# Patient Record
Sex: MALE | Race: WHITE | NOT HISPANIC OR LATINO | Employment: OTHER | URBAN - METROPOLITAN AREA
[De-identification: names, ages, dates, MRNs, and addresses within clinical notes are randomized per-mention and may not be internally consistent; named-entity substitution may affect disease eponyms.]

---

## 2023-03-21 DIAGNOSIS — I10 HYPERTENSION, UNSPECIFIED TYPE: ICD-10-CM

## 2023-03-21 PROBLEM — T85.898A OBSTRUCTED PRESSURE-EQUALIZATION (PE) TUBE: Status: ACTIVE | Noted: 2023-03-21

## 2023-03-21 PROBLEM — H90.5 SENSORINEURAL HEARING LOSS OF RIGHT EAR: Status: ACTIVE | Noted: 2023-03-21

## 2023-03-21 PROBLEM — H69.90 EUSTACHIAN TUBE DYSFUNCTION: Status: ACTIVE | Noted: 2023-03-21

## 2023-03-21 PROBLEM — R09.81 NASAL CONGESTION: Status: ACTIVE | Noted: 2023-03-21

## 2023-03-21 PROBLEM — R05.9 COUGH: Status: ACTIVE | Noted: 2023-03-21

## 2023-03-21 PROBLEM — M19.90 OSTEOARTHRITIS: Status: ACTIVE | Noted: 2023-03-21

## 2023-03-21 PROBLEM — F32.5 MAJOR DEPRESSION IN REMISSION (CMS-HCC): Status: ACTIVE | Noted: 2023-03-21

## 2023-03-21 PROBLEM — E55.9 VITAMIN D DEFICIENCY: Status: ACTIVE | Noted: 2023-03-21

## 2023-03-21 PROBLEM — F41.9 ANXIETY: Status: ACTIVE | Noted: 2023-03-21

## 2023-03-21 PROBLEM — H92.12 OTORRHEA, LEFT EAR: Status: ACTIVE | Noted: 2023-03-21

## 2023-03-21 PROBLEM — L91.8 CUTANEOUS SKIN TAGS: Status: ACTIVE | Noted: 2023-03-21

## 2023-03-21 PROBLEM — H61.20 CERUMEN IMPACTION: Status: ACTIVE | Noted: 2023-03-21

## 2023-03-21 PROBLEM — H65.22 CHRONIC SEROUS OTITIS MEDIA OF LEFT EAR: Status: ACTIVE | Noted: 2023-03-21

## 2023-03-21 PROBLEM — R53.81 MALAISE: Status: ACTIVE | Noted: 2023-03-21

## 2023-03-21 PROBLEM — H90.72 MIXED HEARING LOSS OF LEFT EAR: Status: ACTIVE | Noted: 2023-03-21

## 2023-03-21 PROBLEM — H65.00 ACUTE SEROUS OTITIS MEDIA: Status: ACTIVE | Noted: 2023-03-21

## 2023-03-21 RX ORDER — ERGOCALCIFEROL (VITAMIN D2) 50 MCG
CAPSULE ORAL
COMMUNITY

## 2023-03-21 RX ORDER — VALSARTAN AND HYDROCHLOROTHIAZIDE 320; 25 MG/1; MG/1
1 TABLET, FILM COATED ORAL DAILY
COMMUNITY
Start: 2020-06-18 | End: 2023-03-21 | Stop reason: SDUPTHER

## 2023-03-21 RX ORDER — VALSARTAN AND HYDROCHLOROTHIAZIDE 320; 25 MG/1; MG/1
1 TABLET, FILM COATED ORAL DAILY
Qty: 90 TABLET | Refills: 0 | Status: SHIPPED | OUTPATIENT
Start: 2023-03-21 | End: 2023-06-20 | Stop reason: SDUPTHER

## 2023-03-21 RX ORDER — ATENOLOL 100 MG/1
100 TABLET ORAL DAILY
COMMUNITY
Start: 2022-12-20 | End: 2023-06-20 | Stop reason: SDUPTHER

## 2023-03-21 RX ORDER — SERTRALINE HYDROCHLORIDE 50 MG/1
50 TABLET, FILM COATED ORAL DAILY
COMMUNITY
End: 2023-06-20 | Stop reason: SDUPTHER

## 2023-06-09 ENCOUNTER — TELEPHONE (OUTPATIENT)
Dept: PRIMARY CARE | Facility: CLINIC | Age: 73
End: 2023-06-09
Payer: MEDICARE

## 2023-06-09 NOTE — TELEPHONE ENCOUNTER
Day pt   Needs refill on zoloft 50mg daily #30    Called into [facility] phone [phone]

## 2023-06-09 NOTE — TELEPHONE ENCOUNTER
I LM with pt, he needs to schedule 6 month follow up for refills. Pt has appt in Dec but is for Medicare wellness

## 2023-06-20 ENCOUNTER — OFFICE VISIT (OUTPATIENT)
Dept: PRIMARY CARE | Facility: CLINIC | Age: 73
End: 2023-06-20
Payer: MEDICARE

## 2023-06-20 VITALS
BODY MASS INDEX: 39.1 KG/M2 | TEMPERATURE: 98.1 F | HEART RATE: 78 BPM | HEIGHT: 69 IN | SYSTOLIC BLOOD PRESSURE: 130 MMHG | WEIGHT: 264 LBS | RESPIRATION RATE: 20 BRPM | DIASTOLIC BLOOD PRESSURE: 92 MMHG

## 2023-06-20 DIAGNOSIS — F41.1 GENERALIZED ANXIETY DISORDER: ICD-10-CM

## 2023-06-20 DIAGNOSIS — R05.3 CHRONIC COUGH: ICD-10-CM

## 2023-06-20 DIAGNOSIS — I10 HYPERTENSION, UNSPECIFIED TYPE: ICD-10-CM

## 2023-06-20 DIAGNOSIS — F32.5 MAJOR DEPRESSION IN REMISSION (CMS-HCC): Primary | ICD-10-CM

## 2023-06-20 PROBLEM — H65.00 ACUTE SEROUS OTITIS MEDIA: Status: RESOLVED | Noted: 2023-03-21 | Resolved: 2023-06-20

## 2023-06-20 PROBLEM — R09.81 NASAL CONGESTION: Status: RESOLVED | Noted: 2023-03-21 | Resolved: 2023-06-20

## 2023-06-20 PROBLEM — H61.20 CERUMEN IMPACTION: Status: RESOLVED | Noted: 2023-03-21 | Resolved: 2023-06-20

## 2023-06-20 PROCEDURE — 1160F RVW MEDS BY RX/DR IN RCRD: CPT | Performed by: FAMILY MEDICINE

## 2023-06-20 PROCEDURE — 3080F DIAST BP >= 90 MM HG: CPT | Performed by: FAMILY MEDICINE

## 2023-06-20 PROCEDURE — 3075F SYST BP GE 130 - 139MM HG: CPT | Performed by: FAMILY MEDICINE

## 2023-06-20 PROCEDURE — 1159F MED LIST DOCD IN RCRD: CPT | Performed by: FAMILY MEDICINE

## 2023-06-20 PROCEDURE — 1036F TOBACCO NON-USER: CPT | Performed by: FAMILY MEDICINE

## 2023-06-20 PROCEDURE — 99214 OFFICE O/P EST MOD 30 MIN: CPT | Performed by: FAMILY MEDICINE

## 2023-06-20 RX ORDER — ATENOLOL 100 MG/1
100 TABLET ORAL DAILY
Qty: 90 TABLET | Refills: 1 | Status: SHIPPED | OUTPATIENT
Start: 2023-06-20 | End: 2023-12-05 | Stop reason: SDUPTHER

## 2023-06-20 RX ORDER — SERTRALINE HYDROCHLORIDE 50 MG/1
50 TABLET, FILM COATED ORAL DAILY
Qty: 90 TABLET | Refills: 1 | Status: SHIPPED | OUTPATIENT
Start: 2023-06-20 | End: 2023-12-05 | Stop reason: SDUPTHER

## 2023-06-20 RX ORDER — VALSARTAN AND HYDROCHLOROTHIAZIDE 320; 25 MG/1; MG/1
1 TABLET, FILM COATED ORAL DAILY
Qty: 90 TABLET | Refills: 1 | Status: SHIPPED | OUTPATIENT
Start: 2023-06-20 | End: 2023-12-05 | Stop reason: SDUPTHER

## 2023-06-20 NOTE — PROGRESS NOTES
Brian Larry is a 73 y.o. male here today for   Chief Complaint   Patient presents with    Hypertension    Anxiety    Cough      HTN recheck -- Patient denies chest pain, SOB, edema, palpitations on review.  Taking medication correctly and denies any side effects.   Good when he checks at home.    Mood disorder recheck -- Patient feels like condition is well controlled.  Has good control of mood and emotional reactions.  No SE's or problems with medications.  No homicidal or suicidal ideation.  Patient wishes to continue same medications.   Ran out of sertraline about 10 days ago and he has noticed some increased anxiety and a mild headache.  He would like to restart the sertraline.    Also would like ears checked for wax.      Still with minor cough after he eats for about 15 minutes.   Sury with dairy.  No reflux sxs.  He has completely stopped smoking, vapping and marijuana use for months and he reports that the cough has improved since then.  He denies any shortness of breath, wheezing, chest pain, leg swelling.  He has no past medical history of a chronic lung disease.        Current Outpatient Medications:     ergocalciferol (Vitamin D-2) 50 MCG (2000 UT) capsule capsule, Take by mouth., Disp: , Rfl:     atenolol (Tenormin) 100 mg tablet, Take 1 tablet (100 mg) by mouth once daily., Disp: 90 tablet, Rfl: 1    sertraline (Zoloft) 50 mg tablet, Take 1 tablet (50 mg) by mouth once daily., Disp: 90 tablet, Rfl: 1    valsartan-hydrochlorothiazide (Diovan-HCT) 320-25 mg tablet, Take 1 tablet by mouth once daily., Disp: 90 tablet, Rfl: 1    Patient Active Problem List   Diagnosis    Generalized anxiety disorder    Chronic serous otitis media of left ear    Eustachian tube dysfunction    Cutaneous skin tags    Cough    HTN (hypertension)    Mixed hearing loss of left ear    Malaise    Major depression in remission (CMS/HCC)    Obstructed pressure-equalization (PE) tube    Otorrhea, left ear    Osteoarthritis     "Sensorineural hearing loss of right ear    Vitamin D deficiency         No results found for this or any previous visit (from the past 2016 hour(s)).     Objective    Visit Vitals  BP (!) 130/92   Pulse 78   Temp 36.7 °C (98.1 °F)   Resp 20   Ht 1.74 m (5' 8.5\")   Wt 120 kg (264 lb)   BMI 39.56 kg/m²     Body mass index is 39.56 kg/m².     Physical Exam   General - Not in acute distress and cooperative.  Build & Nutrition - Well developed  Posture - Normal  Gait - Normal  Mental Status - alert and oriented x 3    Head - Normocephalic    Neck - Thyroid normal size    Eyes - Bilateral - Sclera clear and lids pink without edema or mass.      Skin - Warm and dry with no rashes on visible skin    Lungs - Clear to auscultation and normal breathing effort    Cardiovascular - RRR and no murmurs, rubs or thrill.    Peripheral Vascular - Bilateral - no edema present    Neuropsychiatric - normal mood and affect        Assessment    1. Major depression in remission (CMS/HCC)     Condition well controlled.  No change in current treatment regimen.  Refill given of current medication.  Make a follow up appointment with me for recheck in 6 months.     2. Hypertension, unspecified type  atenolol (Tenormin) 100 mg tablet, sertraline (Zoloft) 50 mg tablet, valsartan-hydrochlorothiazide (Diovan-HCT) 320-25 mg tablet, Comprehensive Metabolic Panel, CBC, Lipid Panel   Condition well controlled.  No change in current treatment regimen.  Refill given of current medication.  Appropriate labs ordered or reviewed.  Make a follow up appointment with me for recheck in 6 months.     3. Generalized anxiety disorder     Condition well controlled.  No change in current treatment regimen.  Refill given of current medication.  Make a follow up appointment with me for recheck in 6 months.     4. Chronic cough  XR chest 2 views   This has improved significantly since he stopped vaping.  Since he has a long history of previous tobacco use I am " going to get a chest x-ray.  If the chest x-ray is normal I suggested that he give it about 2 more months.  If his cough has not fully resolved then he may need further evaluation with a CT scan or pulmonary function testing.  We will call him with results.

## 2023-06-21 ENCOUNTER — LAB (OUTPATIENT)
Dept: LAB | Facility: LAB | Age: 73
End: 2023-06-21
Payer: MEDICARE

## 2023-06-21 DIAGNOSIS — I10 HYPERTENSION, UNSPECIFIED TYPE: ICD-10-CM

## 2023-06-21 LAB
ALANINE AMINOTRANSFERASE (SGPT) (U/L) IN SER/PLAS: 19 U/L (ref 10–52)
ALBUMIN (G/DL) IN SER/PLAS: 4.1 G/DL (ref 3.4–5)
ALKALINE PHOSPHATASE (U/L) IN SER/PLAS: 53 U/L (ref 33–136)
ANION GAP IN SER/PLAS: 11 MMOL/L (ref 10–20)
ASPARTATE AMINOTRANSFERASE (SGOT) (U/L) IN SER/PLAS: 17 U/L (ref 9–39)
BILIRUBIN TOTAL (MG/DL) IN SER/PLAS: 0.6 MG/DL (ref 0–1.2)
CALCIUM (MG/DL) IN SER/PLAS: 8.9 MG/DL (ref 8.6–10.3)
CARBON DIOXIDE, TOTAL (MMOL/L) IN SER/PLAS: 29 MMOL/L (ref 21–32)
CHLORIDE (MMOL/L) IN SER/PLAS: 102 MMOL/L (ref 98–107)
CHOLESTEROL (MG/DL) IN SER/PLAS: 155 MG/DL (ref 0–199)
CHOLESTEROL IN HDL (MG/DL) IN SER/PLAS: 29.1 MG/DL
CHOLESTEROL/HDL RATIO: 5.3
CREATININE (MG/DL) IN SER/PLAS: 1.16 MG/DL (ref 0.5–1.3)
ERYTHROCYTE DISTRIBUTION WIDTH (RATIO) BY AUTOMATED COUNT: 13.2 % (ref 11.5–14.5)
ERYTHROCYTE MEAN CORPUSCULAR HEMOGLOBIN CONCENTRATION (G/DL) BY AUTOMATED: 33.3 G/DL (ref 32–36)
ERYTHROCYTE MEAN CORPUSCULAR VOLUME (FL) BY AUTOMATED COUNT: 90 FL (ref 80–100)
ERYTHROCYTES (10*6/UL) IN BLOOD BY AUTOMATED COUNT: 4.91 X10E12/L (ref 4.5–5.9)
GFR MALE: 67 ML/MIN/1.73M2
GLUCOSE (MG/DL) IN SER/PLAS: 122 MG/DL (ref 74–99)
HEMATOCRIT (%) IN BLOOD BY AUTOMATED COUNT: 44.2 % (ref 41–52)
HEMOGLOBIN (G/DL) IN BLOOD: 14.7 G/DL (ref 13.5–17.5)
LDL: 80 MG/DL (ref 0–99)
LEUKOCYTES (10*3/UL) IN BLOOD BY AUTOMATED COUNT: 6.5 X10E9/L (ref 4.4–11.3)
NON HDL CHOLESTEROL: 126 MG/DL
PLATELETS (10*3/UL) IN BLOOD AUTOMATED COUNT: 170 X10E9/L (ref 150–450)
POTASSIUM (MMOL/L) IN SER/PLAS: 3.8 MMOL/L (ref 3.5–5.3)
PROTEIN TOTAL: 6.9 G/DL (ref 6.4–8.2)
SODIUM (MMOL/L) IN SER/PLAS: 138 MMOL/L (ref 136–145)
TRIGLYCERIDE (MG/DL) IN SER/PLAS: 231 MG/DL (ref 0–149)
UREA NITROGEN (MG/DL) IN SER/PLAS: 17 MG/DL (ref 6–23)
VLDL: 46 MG/DL (ref 0–40)

## 2023-06-21 PROCEDURE — 85027 COMPLETE CBC AUTOMATED: CPT

## 2023-06-21 PROCEDURE — 80053 COMPREHEN METABOLIC PANEL: CPT

## 2023-06-21 PROCEDURE — 80061 LIPID PANEL: CPT

## 2023-06-21 PROCEDURE — 36415 COLL VENOUS BLD VENIPUNCTURE: CPT

## 2023-06-22 ENCOUNTER — TELEPHONE (OUTPATIENT)
Dept: PRIMARY CARE | Facility: CLINIC | Age: 73
End: 2023-06-22
Payer: MEDICARE

## 2023-06-22 NOTE — TELEPHONE ENCOUNTER
----- Message from Zion Bernstein MD sent at 6/22/2023  7:54 AM EDT -----  Inform patient of normal results of all recent labs.   Even though some of the lab values may fall outside of the normal range, I do not feel they are clinically concerning or relevant at this time.

## 2023-06-23 ENCOUNTER — TELEPHONE (OUTPATIENT)
Dept: PRIMARY CARE | Facility: CLINIC | Age: 73
End: 2023-06-23
Payer: MEDICARE

## 2023-06-23 NOTE — TELEPHONE ENCOUNTER
----- Message from Zion Bernstein MD sent at 6/23/2023  7:52 AM EDT -----  Inform the patient that his recent chest x-ray was completely normal.

## 2023-10-09 ENCOUNTER — CLINICAL SUPPORT (OUTPATIENT)
Dept: PRIMARY CARE | Facility: CLINIC | Age: 73
End: 2023-10-09
Payer: MEDICARE

## 2023-10-09 DIAGNOSIS — Z23 ENCOUNTER FOR IMMUNIZATION: ICD-10-CM

## 2023-10-09 PROCEDURE — 90662 IIV NO PRSV INCREASED AG IM: CPT | Performed by: FAMILY MEDICINE

## 2023-10-09 PROCEDURE — G0008 ADMIN INFLUENZA VIRUS VAC: HCPCS | Performed by: FAMILY MEDICINE

## 2023-12-05 ENCOUNTER — OFFICE VISIT (OUTPATIENT)
Dept: PRIMARY CARE | Facility: CLINIC | Age: 73
End: 2023-12-05
Payer: MEDICARE

## 2023-12-05 VITALS
HEART RATE: 64 BPM | TEMPERATURE: 97.1 F | BODY MASS INDEX: 37.92 KG/M2 | DIASTOLIC BLOOD PRESSURE: 88 MMHG | WEIGHT: 256 LBS | RESPIRATION RATE: 16 BRPM | HEIGHT: 69 IN | SYSTOLIC BLOOD PRESSURE: 132 MMHG

## 2023-12-05 DIAGNOSIS — I10 PRIMARY HYPERTENSION: ICD-10-CM

## 2023-12-05 DIAGNOSIS — Z12.5 SCREENING FOR PROSTATE CANCER: ICD-10-CM

## 2023-12-05 DIAGNOSIS — F41.1 GENERALIZED ANXIETY DISORDER: ICD-10-CM

## 2023-12-05 DIAGNOSIS — J06.9 VIRAL URI WITH COUGH: ICD-10-CM

## 2023-12-05 DIAGNOSIS — Z11.59 NEED FOR HEPATITIS C SCREENING TEST: ICD-10-CM

## 2023-12-05 DIAGNOSIS — Z13.6 SCREENING FOR AAA (ABDOMINAL AORTIC ANEURYSM): ICD-10-CM

## 2023-12-05 DIAGNOSIS — Z00.00 ROUTINE GENERAL MEDICAL EXAMINATION AT HEALTH CARE FACILITY: Primary | ICD-10-CM

## 2023-12-05 PROCEDURE — 1160F RVW MEDS BY RX/DR IN RCRD: CPT | Performed by: FAMILY MEDICINE

## 2023-12-05 PROCEDURE — G0439 PPPS, SUBSEQ VISIT: HCPCS | Performed by: FAMILY MEDICINE

## 2023-12-05 PROCEDURE — G0444 DEPRESSION SCREEN ANNUAL: HCPCS | Performed by: FAMILY MEDICINE

## 2023-12-05 PROCEDURE — 1170F FXNL STATUS ASSESSED: CPT | Performed by: FAMILY MEDICINE

## 2023-12-05 PROCEDURE — 99213 OFFICE O/P EST LOW 20 MIN: CPT | Performed by: FAMILY MEDICINE

## 2023-12-05 PROCEDURE — 1159F MED LIST DOCD IN RCRD: CPT | Performed by: FAMILY MEDICINE

## 2023-12-05 PROCEDURE — 1036F TOBACCO NON-USER: CPT | Performed by: FAMILY MEDICINE

## 2023-12-05 PROCEDURE — 3079F DIAST BP 80-89 MM HG: CPT | Performed by: FAMILY MEDICINE

## 2023-12-05 PROCEDURE — 3075F SYST BP GE 130 - 139MM HG: CPT | Performed by: FAMILY MEDICINE

## 2023-12-05 RX ORDER — ALBUTEROL SULFATE 90 UG/1
2 AEROSOL, METERED RESPIRATORY (INHALATION) EVERY 6 HOURS PRN
Qty: 18 G | Refills: 1 | Status: SHIPPED | OUTPATIENT
Start: 2023-12-05

## 2023-12-05 RX ORDER — VALSARTAN AND HYDROCHLOROTHIAZIDE 320; 25 MG/1; MG/1
1 TABLET, FILM COATED ORAL DAILY
Qty: 90 TABLET | Refills: 1 | Status: SHIPPED | OUTPATIENT
Start: 2023-12-05 | End: 2024-06-04 | Stop reason: SDUPTHER

## 2023-12-05 RX ORDER — ATENOLOL 100 MG/1
100 TABLET ORAL DAILY
Qty: 90 TABLET | Refills: 1 | Status: SHIPPED | OUTPATIENT
Start: 2023-12-05 | End: 2024-06-04 | Stop reason: SDUPTHER

## 2023-12-05 RX ORDER — SERTRALINE HYDROCHLORIDE 50 MG/1
50 TABLET, FILM COATED ORAL DAILY
Qty: 90 TABLET | Refills: 1 | Status: SHIPPED | OUTPATIENT
Start: 2023-12-05 | End: 2024-06-04 | Stop reason: SDUPTHER

## 2023-12-05 ASSESSMENT — ACTIVITIES OF DAILY LIVING (ADL)
BATHING: INDEPENDENT
DOING_HOUSEWORK: INDEPENDENT
MANAGING_FINANCES: INDEPENDENT
TAKING_MEDICATION: INDEPENDENT
GROCERY_SHOPPING: INDEPENDENT
DRESSING: INDEPENDENT

## 2023-12-05 ASSESSMENT — PATIENT HEALTH QUESTIONNAIRE - PHQ9
2. FEELING DOWN, DEPRESSED OR HOPELESS: NOT AT ALL
1. LITTLE INTEREST OR PLEASURE IN DOING THINGS: NOT AT ALL
SUM OF ALL RESPONSES TO PHQ9 QUESTIONS 1 AND 2: 0

## 2023-12-05 NOTE — PROGRESS NOTES
History Of Present Illness  Brian Larry is a 73 y.o. male presenting for a Medicare Annual Wellness Exam.  Patient is here for a periodic health exam.  I reviewed previous preventative health measures including screening tests, immunizations and labs.  I reviewed screenings administered by my staff today.    He has had a mild cough for about 1 month which is slowly getting better.  He asked for refill of albuterol for as needed use.    HTN recheck -- Patient denies chest pain, SOB, edema, palpitations on review.  Taking medication correctly and denies any side effects.      Mood disorder recheck -- Patient feels like condition is well controlled.  Has good control of mood and emotional reactions.  No SE's or problems with medications.  No homicidal or suicidal ideation.  Patient wishes to continue same medications.      PREVIOUS PREVENTATIVE HEALTH  Colonoscopy : Yes    Date: 8/2021  Cologuard : No  Prostate cancer screening with PSA : Yes    Date: 12/21/2022  Hepatitis C Antibody : No  Prevnar : Yes    Date: 7/21/2016-Prevnar 13  Shingrix : Yes    Date: 6/22/2012-Zostavax  Tdap within 10 years : Yes  Yearly Flu Shot : Yes      CURRENT FINDINGS  Falls : No  Problems with ADL's :  No  Healthy Diet : Yes  Exercise :  Yes  Vision or Hearing Problems : Yes  Depression Issues : No  Alcohol Use : Yes.  About 9-10 drinks per week.    Tobacco Use : No.  Previous smoker.           Past Medical History  Patient Active Problem List   Diagnosis    Generalized anxiety disorder    Chronic serous otitis media of left ear    Eustachian tube dysfunction    Cutaneous skin tags    Cough    Primary hypertension    Mixed hearing loss of left ear    Malaise    Major depression in remission (CMS/HCC)    Obstructed pressure-equalization (PE) tube    Otorrhea, left ear    Osteoarthritis    Sensorineural hearing loss of right ear    Vitamin D deficiency    Contusion of shoulder region    S/P left knee arthroscopy    Tear of medial  meniscus of knee       Past Surgical History:   Procedure Laterality Date    OTHER SURGICAL HISTORY  12/19/2019    Vasectomy    OTHER SURGICAL HISTORY  12/19/2019    Ear pressure equalization tube insertion bilateral        Current Outpatient Medications:     ergocalciferol (Vitamin D-2) 50 MCG (2000 UT) capsule capsule, Take by mouth., Disp: , Rfl:     albuterol 90 mcg/actuation inhaler, Inhale 2 puffs every 6 hours if needed for wheezing., Disp: 18 g, Rfl: 1    atenolol (Tenormin) 100 mg tablet, Take 1 tablet (100 mg) by mouth once daily., Disp: 90 tablet, Rfl: 1    sertraline (Zoloft) 50 mg tablet, Take 1 tablet (50 mg) by mouth once daily., Disp: 90 tablet, Rfl: 1    valsartan-hydrochlorothiazide (Diovan-HCT) 320-25 mg tablet, Take 1 tablet by mouth once daily., Disp: 90 tablet, Rfl: 1   Immunization History   Administered Date(s) Administered    Flu vaccine, quadrivalent, high-dose, preservative free, age 65y+ (FLUZONE) 10/09/2023    Pfizer COVID-19 vaccine, bivalent, age 12 years and older (30 mcg/0.3 mL) 09/20/2022    Pfizer Gray Cap SARS-CoV-2 04/25/2022    Pfizer Purple Cap SARS-CoV-2 03/08/2021, 04/05/2021, 10/04/2021    Pneumococcal conjugate vaccine, 13-valent (PREVNAR 13) 07/21/2016    Pneumococcal polysaccharide vaccine, 23-valent, age 2 years and older (PNEUMOVAX 23) 07/23/2014, 12/16/2021    Tdap vaccine, age 7 year and older (BOOSTRIX) 06/22/2012, 01/23/2023    Varicella vaccine, subcutaneous (VARIVAX) 06/22/2012    Zoster, live 06/22/2012        Social History  Social History     Socioeconomic History    Marital status:      Spouse name: Not on file    Number of children: Not on file    Years of education: Not on file    Highest education level: Not on file   Occupational History    Not on file   Tobacco Use    Smoking status: Former     Types: Cigarettes     Passive exposure: Never    Smokeless tobacco: Never   Vaping Use    Vaping Use: Former   Substance and Sexual Activity    Alcohol  "use: Never    Drug use: Never    Sexual activity: Not on file   Other Topics Concern    Not on file   Social History Narrative    Not on file     Social Determinants of Health     Financial Resource Strain: Not on file   Food Insecurity: Not on file   Transportation Needs: Not on file   Physical Activity: Not on file   Stress: Not on file   Social Connections: Not on file   Intimate Partner Violence: Not on file   Housing Stability: Not on file        reports no history of alcohol use.    reports that he has quit smoking. His smoking use included cigarettes. He has never been exposed to tobacco smoke. He has never used smokeless tobacco.    reports no history of drug use.           Allergies  Patient has no known allergies.      Physical Exam   height is 1.74 m (5' 8.5\") and weight is 116 kg (256 lb). His temperature is 36.2 °C (97.1 °F). His blood pressure is 132/88 and his pulse is 64. His respiration is 16.    Physical Exam  Vitals and nursing note reviewed.   Constitutional:       General: He is not in acute distress.     Appearance: Normal appearance.   HENT:      Head: Normocephalic and atraumatic.      Right Ear: Tympanic membrane, ear canal and external ear normal.      Left Ear: Tympanic membrane, ear canal and external ear normal.      Nose: Nose normal.      Mouth/Throat:      Mouth: Mucous membranes are moist.      Pharynx: Oropharynx is clear.   Eyes:      Extraocular Movements: Extraocular movements intact.      Conjunctiva/sclera: Conjunctivae normal.      Pupils: Pupils are equal, round, and reactive to light.   Cardiovascular:      Rate and Rhythm: Normal rate and regular rhythm.      Pulses: Normal pulses.      Heart sounds: Normal heart sounds. No murmur heard.     No friction rub. No gallop.   Pulmonary:      Effort: Pulmonary effort is normal. No respiratory distress.      Breath sounds: Normal breath sounds.   Abdominal:      General: Abdomen is flat. Bowel sounds are normal. There is no " distension.      Palpations: Abdomen is soft.      Tenderness: There is no abdominal tenderness.   Musculoskeletal:         General: Normal range of motion.      Cervical back: Normal range of motion and neck supple.   Lymphadenopathy:      Cervical: No cervical adenopathy.   Skin:     General: Skin is warm and dry.      Findings: No lesion or rash.   Neurological:      General: No focal deficit present.      Mental Status: He is alert. Mental status is at baseline.   Psychiatric:         Mood and Affect: Mood normal.         Behavior: Behavior normal.         Thought Content: Thought content normal.         Judgment: Judgment normal.                 Assessment      1. Routine general medical examination at health care facility  1 Year Follow Up In [facility] - PCP - Wellness Exam      2. Need for hepatitis C screening test  Hepatitis C antibody      3. Screening for AAA (abdominal aortic aneurysm)  Vascular US abdominal aorta anuerysm AAA screening      4. Screening for prostate cancer  Prostate Specific Antigen, Screen      5. Primary hypertension  Comprehensive Metabolic Panel, CBC, atenolol (Tenormin) 100 mg tablet, valsartan-hydrochlorothiazide (Diovan-HCT) 320-25 mg tablet   Condition well controlled.  No change in current treatment regimen.  Refill given of current medication.  Appropriate labs ordered or reviewed.  Make a follow up appointment with me for recheck in 6 months.       6. Viral URI with cough  albuterol 90 mcg/actuation inhaler   We will refill albuterol for as needed use.  Call or RTO if symptoms worsen or don't fully resolve.  Increase fluid intake.  Rest.  May use OTC symptomatic medications as needed at the appropriate dose.     7. Generalized anxiety disorder  sertraline (Zoloft) 50 mg tablet   Condition well controlled.  No change in current treatment regimen.  Refill given of current medication.  Make a follow up appointment with me for recheck in 6 months.             I  recommend regular exercise, balanced diet, regular dental exams, and healthy habits.  Patient denies depression sxs.  Patient is able to perform all ADL's without assistance.  I reminded patient to get yearly eye exams with glaucoma screening.  I recommend to eat plenty of plant foods (such as whole-grain products, fruits, and vegetables) and a moderate amount of lean and low-fat, animal-based food (meat and dairy products).  When shopping, choose lean meats, fish, and poultry. I recommend to try to get regular aerobic exercise.  I recommend a yearly flu shot in the fall and I recommend a yearly wellness exam.                   Zion Bernstein MD

## 2023-12-19 ENCOUNTER — LAB (OUTPATIENT)
Dept: LAB | Facility: LAB | Age: 73
End: 2023-12-19
Payer: MEDICARE

## 2023-12-19 ENCOUNTER — ANCILLARY PROCEDURE (OUTPATIENT)
Dept: RADIOLOGY | Facility: CLINIC | Age: 73
End: 2023-12-19
Payer: MEDICARE

## 2023-12-19 DIAGNOSIS — Z12.5 SCREENING FOR PROSTATE CANCER: ICD-10-CM

## 2023-12-19 DIAGNOSIS — Z11.59 NEED FOR HEPATITIS C SCREENING TEST: ICD-10-CM

## 2023-12-19 DIAGNOSIS — Z13.6 SCREENING FOR AAA (ABDOMINAL AORTIC ANEURYSM): ICD-10-CM

## 2023-12-19 DIAGNOSIS — I10 PRIMARY HYPERTENSION: ICD-10-CM

## 2023-12-19 LAB
ALBUMIN SERPL BCP-MCNC: 4.2 G/DL (ref 3.4–5)
ALP SERPL-CCNC: 56 U/L (ref 33–136)
ALT SERPL W P-5'-P-CCNC: 26 U/L (ref 10–52)
ANION GAP SERPL CALC-SCNC: 11 MMOL/L (ref 10–20)
AST SERPL W P-5'-P-CCNC: 21 U/L (ref 9–39)
BILIRUB SERPL-MCNC: 0.5 MG/DL (ref 0–1.2)
BUN SERPL-MCNC: 14 MG/DL (ref 6–23)
CALCIUM SERPL-MCNC: 9.2 MG/DL (ref 8.6–10.3)
CHLORIDE SERPL-SCNC: 104 MMOL/L (ref 98–107)
CO2 SERPL-SCNC: 29 MMOL/L (ref 21–32)
CREAT SERPL-MCNC: 0.91 MG/DL (ref 0.5–1.3)
ERYTHROCYTE [DISTWIDTH] IN BLOOD BY AUTOMATED COUNT: 14 % (ref 11.5–14.5)
GFR SERPL CREATININE-BSD FRML MDRD: 90 ML/MIN/1.73M*2
GLUCOSE SERPL-MCNC: 107 MG/DL (ref 74–99)
HCT VFR BLD AUTO: 44.8 % (ref 41–52)
HCV AB SER QL: NONREACTIVE
HGB BLD-MCNC: 14.4 G/DL (ref 13.5–17.5)
MCH RBC QN AUTO: 29.6 PG (ref 26–34)
MCHC RBC AUTO-ENTMCNC: 32.1 G/DL (ref 32–36)
MCV RBC AUTO: 92 FL (ref 80–100)
NRBC BLD-RTO: 0 /100 WBCS (ref 0–0)
PLATELET # BLD AUTO: 181 X10*3/UL (ref 150–450)
POTASSIUM SERPL-SCNC: 4 MMOL/L (ref 3.5–5.3)
PROT SERPL-MCNC: 7 G/DL (ref 6.4–8.2)
PSA SERPL-MCNC: 1.12 NG/ML
RBC # BLD AUTO: 4.87 X10*6/UL (ref 4.5–5.9)
SODIUM SERPL-SCNC: 140 MMOL/L (ref 136–145)
WBC # BLD AUTO: 5.2 X10*3/UL (ref 4.4–11.3)

## 2023-12-19 PROCEDURE — 80053 COMPREHEN METABOLIC PANEL: CPT

## 2023-12-19 PROCEDURE — 85027 COMPLETE CBC AUTOMATED: CPT

## 2023-12-19 PROCEDURE — 76706 US ABDL AORTA SCREEN AAA: CPT

## 2023-12-19 PROCEDURE — G0103 PSA SCREENING: HCPCS

## 2023-12-19 PROCEDURE — 36415 COLL VENOUS BLD VENIPUNCTURE: CPT

## 2023-12-19 PROCEDURE — 86803 HEPATITIS C AB TEST: CPT

## 2023-12-19 PROCEDURE — 76706 US ABDL AORTA SCREEN AAA: CPT | Performed by: RADIOLOGY

## 2023-12-21 NOTE — RESULT ENCOUNTER NOTE
Please inform the patient that his ultrasound did not show any evidence of abdominal aortic aneurysm and no further follow-up is needed.

## 2024-04-02 ENCOUNTER — CLINICAL SUPPORT (OUTPATIENT)
Dept: AUDIOLOGY | Facility: CLINIC | Age: 74
End: 2024-04-02

## 2024-04-02 DIAGNOSIS — H90.3 ASYMMETRIC SNHL (SENSORINEURAL HEARING LOSS): Primary | ICD-10-CM

## 2024-04-02 NOTE — PROGRESS NOTES
Mr. Larry, age 73, was seen today for a check on his binaural Phonak [employer]udeo B90-D hearing aids and to buy batteries.  He denied significant issues with his devices but has been noticing increasing his manual volume control by 2-3 steps more regularly.  He is also in need of help re-pairing his devices with the Setgo emely.    Procedure:  Hearing aids were cleaned/checked and target gain was increased from 80% to 100% in both devices.  Pairing was reestablished successfully in the Setgo emely. Dispensed one carton of size 13 batteries and payment was accepted in full.  He reported satisfaction and will return as needed.

## 2024-06-04 ENCOUNTER — OFFICE VISIT (OUTPATIENT)
Dept: PRIMARY CARE | Facility: CLINIC | Age: 74
End: 2024-06-04
Payer: MEDICARE

## 2024-06-04 DIAGNOSIS — I10 PRIMARY HYPERTENSION: ICD-10-CM

## 2024-06-04 DIAGNOSIS — F41.1 GENERALIZED ANXIETY DISORDER: ICD-10-CM

## 2024-06-04 DIAGNOSIS — E55.9 VITAMIN D DEFICIENCY: Primary | ICD-10-CM

## 2024-06-04 DIAGNOSIS — N64.4 NIPPLE PAIN: ICD-10-CM

## 2024-06-04 PROCEDURE — 1159F MED LIST DOCD IN RCRD: CPT | Performed by: FAMILY MEDICINE

## 2024-06-04 PROCEDURE — 3075F SYST BP GE 130 - 139MM HG: CPT | Performed by: FAMILY MEDICINE

## 2024-06-04 PROCEDURE — 1160F RVW MEDS BY RX/DR IN RCRD: CPT | Performed by: FAMILY MEDICINE

## 2024-06-04 PROCEDURE — 99214 OFFICE O/P EST MOD 30 MIN: CPT | Performed by: FAMILY MEDICINE

## 2024-06-04 PROCEDURE — 3079F DIAST BP 80-89 MM HG: CPT | Performed by: FAMILY MEDICINE

## 2024-06-04 RX ORDER — AMLODIPINE BESYLATE 5 MG/1
5 TABLET ORAL DAILY
Qty: 90 TABLET | Refills: 1 | Status: SHIPPED | OUTPATIENT
Start: 2024-06-04

## 2024-06-04 RX ORDER — VALSARTAN AND HYDROCHLOROTHIAZIDE 320; 25 MG/1; MG/1
1 TABLET, FILM COATED ORAL DAILY
Qty: 90 TABLET | Refills: 1 | Status: SHIPPED | OUTPATIENT
Start: 2024-06-04

## 2024-06-04 RX ORDER — ATENOLOL 100 MG/1
100 TABLET ORAL DAILY
Qty: 90 TABLET | Refills: 1 | Status: SHIPPED | OUTPATIENT
Start: 2024-06-04

## 2024-06-04 RX ORDER — SERTRALINE HYDROCHLORIDE 50 MG/1
50 TABLET, FILM COATED ORAL DAILY
Qty: 90 TABLET | Refills: 1 | Status: SHIPPED | OUTPATIENT
Start: 2024-06-04

## 2024-06-04 NOTE — PROGRESS NOTES
Brian Larry is a 74 y.o. male here today for   Chief Complaint   Patient presents with    Anxiety    Hypertension        HPI     HTN recheck -- Patient denies chest pain, SOB, edema, palpitations on review.  Taking medication correctly and denies any side effects.  Not checking BP at home.  Took meds today.  He feels stressed today.      Mood disorder recheck -- Patient feels like condition is well controlled.  Has good control of mood and emotional reactions.  No SE's or problems with medications.  No homicidal or suicidal ideation.  Patient wishes to continue same medications.    Right nipple sharp pain since a few months ago.  No lump or mass.  He says he only has pain when he pushes on it.  There has been no injury and no redness or swelling.    Vitamin D deficiency -- Patient reports no muscle weakness or pain.  Taking Vitamin D in MVI 1000 U daily.        Current Outpatient Medications:     albuterol 90 mcg/actuation inhaler, Inhale 2 puffs every 6 hours if needed for wheezing., Disp: 18 g, Rfl: 1    ergocalciferol (Vitamin D-2) 50 MCG (2000 UT) capsule capsule, Take by mouth., Disp: , Rfl:     amLODIPine (Norvasc) 5 mg tablet, Take 1 tablet (5 mg) by mouth once daily., Disp: 90 tablet, Rfl: 1    atenolol (Tenormin) 100 mg tablet, Take 1 tablet (100 mg) by mouth once daily., Disp: 90 tablet, Rfl: 1    sertraline (Zoloft) 50 mg tablet, Take 1 tablet (50 mg) by mouth once daily., Disp: 90 tablet, Rfl: 1    valsartan-hydrochlorothiazide (Diovan-HCT) 320-25 mg tablet, Take 1 tablet by mouth once daily., Disp: 90 tablet, Rfl: 1    Patient Active Problem List   Diagnosis    Generalized anxiety disorder    Chronic serous otitis media of left ear    Eustachian tube dysfunction    Cutaneous skin tags    Cough    Primary hypertension    Mixed hearing loss of left ear    Malaise    Major depression in remission (CMS-HCC)    Obstructed pressure-equalization (PE) tube    Otorrhea, left ear    Osteoarthritis     "Sensorineural hearing loss of right ear    Vitamin D deficiency    Contusion of shoulder region    S/P left knee arthroscopy    Tear of medial meniscus of knee    Nipple pain         Recent Results (from the past 672 hour(s))   CBC    Collection Time: 06/05/24  9:09 AM   Result Value Ref Range    WBC 6.5 4.4 - 11.3 x10*3/uL    nRBC 0.0 0.0 - 0.0 /100 WBCs    RBC 4.81 4.50 - 5.90 x10*6/uL    Hemoglobin 14.5 13.5 - 17.5 g/dL    Hematocrit 43.5 41.0 - 52.0 %    MCV 90 80 - 100 fL    MCH 30.1 26.0 - 34.0 pg    MCHC 33.3 32.0 - 36.0 g/dL    RDW 14.3 11.5 - 14.5 %    Platelets 198 150 - 450 x10*3/uL        Objective    Visit Vitals    Visit Vitals  /82   Pulse 65   Temp 35.9 °C (96.7 °F)   Resp 16   Ht 1.74 m (5' 8.5\")   Wt 121 kg (266 lb)   BMI 39.86 kg/m²   Smoking Status Former   BSA 2.42 m²       Body mass index is 39.86 kg/m².     Physical Exam   General - Not in acute distress and cooperative.  Build & Nutrition - Well developed  Posture - Normal  Gait - Normal  Mental Status - alert and oriented x 3    Head - Normocephalic    Neck - Thyroid normal size    Eyes - Bilateral - Sclera clear and lids pink without edema or mass.      Skin - Warm and dry with no rashes on visible skin    Lungs - Clear to auscultation and normal breathing effort    Cardiovascular - RRR and no murmurs, rubs or thrill.    Peripheral Vascular - Bilateral - no edema present    Neuropsychiatric - normal mood and affect    Breasts-no lumps or masses in the breast tissue or underneath the nipples.  There is some mild tenderness of the right nipple when I push in on it.  No skin changes or erythema or bruising.    Assessment    1. Vitamin D deficiency  Vitamin D 25-Hydroxy,Total (for eval of Vitamin D levels)   Appropriate labs ordered or reviewed.     2. Primary hypertension  atenolol (Tenormin) 100 mg tablet, valsartan-hydrochlorothiazide (Diovan-HCT) 320-25 mg tablet, amLODIPine (Norvasc) 5 mg tablet, Comprehensive Metabolic Panel, CBC, " TSH with reflex to Free T4 if abnormal   Condition well controlled.  No change in current treatment regimen.  Refill given of current medication.  Appropriate labs ordered or reviewed.  Make a follow up appointment with me for recheck in 6 months.         3. Generalized anxiety disorder  sertraline (Zoloft) 50 mg tablet   Condition well controlled.  No change in current treatment regimen.  Refill given of current medication.  Make a follow up appointment with me for recheck in 6 months.       4. Nipple pain     No signs or symptoms of an immediately serious or dangerous etiology.  I recommend that he monitor this and if it does not resolve over the next month he should make a follow-up appointment for recheck.         Orders Placed This Encounter      amLODIPine (Norvasc) 5 mg tablet      atenolol (Tenormin) 100 mg tablet      sertraline (Zoloft) 50 mg tablet      valsartan-hydrochlorothiazide (Diovan-HCT) 320-25 mg tablet       Orders Placed This Encounter   Procedures    Comprehensive Metabolic Panel    CBC    TSH with reflex to Free T4 if abnormal    Vitamin D 25-Hydroxy,Total (for eval of Vitamin D levels)        New Medications Ordered This Visit   Medications    atenolol (Tenormin) 100 mg tablet     Sig: Take 1 tablet (100 mg) by mouth once daily.     Dispense:  90 tablet     Refill:  1    sertraline (Zoloft) 50 mg tablet     Sig: Take 1 tablet (50 mg) by mouth once daily.     Dispense:  90 tablet     Refill:  1    valsartan-hydrochlorothiazide (Diovan-HCT) 320-25 mg tablet     Sig: Take 1 tablet by mouth once daily.     Dispense:  90 tablet     Refill:  1    amLODIPine (Norvasc) 5 mg tablet     Sig: Take 1 tablet (5 mg) by mouth once daily.     Dispense:  90 tablet     Refill:  1           Plan: Pt also inquired about microblading. Recommend microblading with Estela at [facility] or Laurie at [facility] (info given) Detail Level: Zone

## 2024-06-05 ENCOUNTER — LAB (OUTPATIENT)
Dept: LAB | Facility: LAB | Age: 74
End: 2024-06-05
Payer: MEDICARE

## 2024-06-05 VITALS
RESPIRATION RATE: 16 BRPM | DIASTOLIC BLOOD PRESSURE: 82 MMHG | SYSTOLIC BLOOD PRESSURE: 134 MMHG | HEIGHT: 69 IN | WEIGHT: 266 LBS | BODY MASS INDEX: 39.4 KG/M2 | HEART RATE: 65 BPM | TEMPERATURE: 96.7 F

## 2024-06-05 DIAGNOSIS — I10 PRIMARY HYPERTENSION: ICD-10-CM

## 2024-06-05 DIAGNOSIS — E55.9 VITAMIN D DEFICIENCY: ICD-10-CM

## 2024-06-05 PROBLEM — N64.4 NIPPLE PAIN: Status: ACTIVE | Noted: 2024-06-05

## 2024-06-05 LAB
25(OH)D3 SERPL-MCNC: 16 NG/ML (ref 30–100)
ALBUMIN SERPL BCP-MCNC: 4.5 G/DL (ref 3.4–5)
ALP SERPL-CCNC: 53 U/L (ref 33–136)
ALT SERPL W P-5'-P-CCNC: 23 U/L (ref 10–52)
ANION GAP SERPL CALC-SCNC: 12 MMOL/L (ref 10–20)
AST SERPL W P-5'-P-CCNC: 21 U/L (ref 9–39)
BILIRUB SERPL-MCNC: 0.8 MG/DL (ref 0–1.2)
BUN SERPL-MCNC: 14 MG/DL (ref 6–23)
CALCIUM SERPL-MCNC: 9.5 MG/DL (ref 8.6–10.3)
CHLORIDE SERPL-SCNC: 100 MMOL/L (ref 98–107)
CO2 SERPL-SCNC: 31 MMOL/L (ref 21–32)
CREAT SERPL-MCNC: 1.03 MG/DL (ref 0.5–1.3)
EGFRCR SERPLBLD CKD-EPI 2021: 77 ML/MIN/1.73M*2
ERYTHROCYTE [DISTWIDTH] IN BLOOD BY AUTOMATED COUNT: 14.3 % (ref 11.5–14.5)
GLUCOSE SERPL-MCNC: 113 MG/DL (ref 74–99)
HCT VFR BLD AUTO: 43.5 % (ref 41–52)
HGB BLD-MCNC: 14.5 G/DL (ref 13.5–17.5)
MCH RBC QN AUTO: 30.1 PG (ref 26–34)
MCHC RBC AUTO-ENTMCNC: 33.3 G/DL (ref 32–36)
MCV RBC AUTO: 90 FL (ref 80–100)
NRBC BLD-RTO: 0 /100 WBCS (ref 0–0)
PLATELET # BLD AUTO: 198 X10*3/UL (ref 150–450)
POTASSIUM SERPL-SCNC: 3.9 MMOL/L (ref 3.5–5.3)
PROT SERPL-MCNC: 7.3 G/DL (ref 6.4–8.2)
RBC # BLD AUTO: 4.81 X10*6/UL (ref 4.5–5.9)
SODIUM SERPL-SCNC: 139 MMOL/L (ref 136–145)
TSH SERPL-ACNC: 1.78 MIU/L (ref 0.44–3.98)
WBC # BLD AUTO: 6.5 X10*3/UL (ref 4.4–11.3)

## 2024-06-05 PROCEDURE — 85027 COMPLETE CBC AUTOMATED: CPT

## 2024-06-05 PROCEDURE — 82306 VITAMIN D 25 HYDROXY: CPT

## 2024-06-05 PROCEDURE — 84443 ASSAY THYROID STIM HORMONE: CPT

## 2024-06-05 PROCEDURE — 80053 COMPREHEN METABOLIC PANEL: CPT

## 2024-06-05 PROCEDURE — 36415 COLL VENOUS BLD VENIPUNCTURE: CPT

## 2024-06-06 NOTE — RESULT ENCOUNTER NOTE
Please inform the patient that his recent labs show that his vitamin D is slightly low again.  If he is already taking vitamin D 2000 units over-the-counter daily I want him to increase it up to 5000 units daily over-the-counter.  The rest of his labs were essentially normal.

## 2024-06-27 ENCOUNTER — OFFICE VISIT (OUTPATIENT)
Dept: PRIMARY CARE | Facility: CLINIC | Age: 74
End: 2024-06-27
Payer: MEDICARE

## 2024-06-27 VITALS
SYSTOLIC BLOOD PRESSURE: 124 MMHG | BODY MASS INDEX: 39.71 KG/M2 | WEIGHT: 262 LBS | DIASTOLIC BLOOD PRESSURE: 60 MMHG | OXYGEN SATURATION: 98 % | RESPIRATION RATE: 18 BRPM | HEIGHT: 68 IN | TEMPERATURE: 97.6 F | HEART RATE: 53 BPM

## 2024-06-27 DIAGNOSIS — M79.89 BILATERAL SWELLING OF FEET: ICD-10-CM

## 2024-06-27 DIAGNOSIS — I10 PRIMARY HYPERTENSION: Primary | ICD-10-CM

## 2024-06-27 PROCEDURE — 3074F SYST BP LT 130 MM HG: CPT | Performed by: NURSE PRACTITIONER

## 2024-06-27 PROCEDURE — 99214 OFFICE O/P EST MOD 30 MIN: CPT | Performed by: NURSE PRACTITIONER

## 2024-06-27 PROCEDURE — 1159F MED LIST DOCD IN RCRD: CPT | Performed by: NURSE PRACTITIONER

## 2024-06-27 PROCEDURE — 3078F DIAST BP <80 MM HG: CPT | Performed by: NURSE PRACTITIONER

## 2024-06-27 ASSESSMENT — ENCOUNTER SYMPTOMS
FATIGUE: 0
AGITATION: 0
SHORTNESS OF BREATH: 0
SLEEP DISTURBANCE: 0
FEVER: 0
NERVOUS/ANXIOUS: 0
COUGH: 0
CONSTIPATION: 0
NAUSEA: 0
VOMITING: 0
DIARRHEA: 0
WEAKNESS: 0

## 2024-06-27 NOTE — PROGRESS NOTES
"Subjective   Patient ID: Brian Larry is a 74 y.o. male who presents for Foot Swelling (Dr. Bernstein added new BP medication. Amlodipine 5mg. Swollen feet and ankle ).    His swelling started about a week after starting the amlodipine, a common side effect,  he does monitor his BP at home and has been better. He has follow up with Dr Bernstein on 7/8/24           Review of Systems   Constitutional:  Negative for fatigue and fever.   Respiratory:  Negative for cough and shortness of breath.    Cardiovascular:  Positive for leg swelling. Negative for chest pain.        Bilateral feet to ankle   Gastrointestinal:  Negative for constipation, diarrhea, nausea and vomiting.   Skin:  Negative for pallor and rash.   Neurological:  Negative for weakness.   Psychiatric/Behavioral:  Negative for agitation, behavioral problems and sleep disturbance. The patient is not nervous/anxious.        Objective   /60   Pulse 53   Temp 36.4 °C (97.6 °F)   Resp 18   Ht 1.727 m (5' 8\")   Wt 119 kg (262 lb)   SpO2 98%   BMI 39.84 kg/m²     Physical Exam  Vitals and nursing note reviewed.   Constitutional:       General: He is not in acute distress.  HENT:      Head: Normocephalic and atraumatic.      Ears:      Comments: [place]  Cardiovascular:      Rate and Rhythm: Normal rate and regular rhythm.   Pulmonary:      Effort: Pulmonary effort is normal.      Breath sounds: Normal breath sounds.   Musculoskeletal:         General: Tenderness present.      Right lower leg: Edema present.      Left lower leg: Edema present.      Comments: +2-3 Edema to bilateral feet   Skin:     General: Skin is warm and dry.   Neurological:      Mental Status: He is alert and oriented to person, place, and time.   Psychiatric:         Mood and Affect: Mood normal.         Assessment/Plan   Problem List Items Addressed This Visit             ICD-10-CM    Primary hypertension - Primary I10     Swelling of feet after taking amlodipine  No SOB or cough  Stop " taking and follow up with Dr Bernstein as scheduled 7/8  Monitor BP daily at home and record           Bilateral swelling of feet M79.89     Stop amlodipine, most likely side effect  Follow up with Dr Bernstein  Elevate as able when sitting and should resolve

## 2024-06-27 NOTE — ASSESSMENT & PLAN NOTE
Stop amlodipine, most likely side effect  Follow up with Dr Bernstein  Elevate as able when sitting and should resolve

## 2024-06-27 NOTE — ASSESSMENT & PLAN NOTE
Swelling of feet after taking amlodipine  No SOB or cough  Stop taking and follow up with Dr Bernstein as scheduled 7/8  Monitor BP daily at home and record

## 2024-07-08 ENCOUNTER — APPOINTMENT (OUTPATIENT)
Dept: PRIMARY CARE | Facility: CLINIC | Age: 74
End: 2024-07-08
Payer: MEDICARE

## 2024-07-08 VITALS
HEART RATE: 58 BPM | TEMPERATURE: 98 F | WEIGHT: 264 LBS | BODY MASS INDEX: 40.01 KG/M2 | SYSTOLIC BLOOD PRESSURE: 152 MMHG | DIASTOLIC BLOOD PRESSURE: 100 MMHG | HEIGHT: 68 IN | RESPIRATION RATE: 20 BRPM

## 2024-07-08 DIAGNOSIS — I10 PRIMARY HYPERTENSION: Primary | ICD-10-CM

## 2024-07-08 PROBLEM — F41.9 ANXIETY: Status: ACTIVE | Noted: 2024-07-08

## 2024-07-08 PROBLEM — F32.A DEPRESSIVE DISORDER: Status: ACTIVE | Noted: 2024-07-08

## 2024-07-08 PROBLEM — E66.01 SEVERE OBESITY (MULTI): Status: ACTIVE | Noted: 2024-07-08

## 2024-07-08 PROBLEM — E78.5 HYPERLIPIDEMIA: Status: ACTIVE | Noted: 2024-07-08

## 2024-07-08 PROCEDURE — 1160F RVW MEDS BY RX/DR IN RCRD: CPT | Performed by: FAMILY MEDICINE

## 2024-07-08 PROCEDURE — 1159F MED LIST DOCD IN RCRD: CPT | Performed by: FAMILY MEDICINE

## 2024-07-08 PROCEDURE — 99214 OFFICE O/P EST MOD 30 MIN: CPT | Performed by: FAMILY MEDICINE

## 2024-07-08 PROCEDURE — 3080F DIAST BP >= 90 MM HG: CPT | Performed by: FAMILY MEDICINE

## 2024-07-08 PROCEDURE — 1036F TOBACCO NON-USER: CPT | Performed by: FAMILY MEDICINE

## 2024-07-08 PROCEDURE — 3077F SYST BP >= 140 MM HG: CPT | Performed by: FAMILY MEDICINE

## 2024-07-08 RX ORDER — CHLORTHALIDONE 25 MG/1
25 TABLET ORAL DAILY
Qty: 30 TABLET | Refills: 1 | Status: SHIPPED | OUTPATIENT
Start: 2024-07-08

## 2024-07-08 RX ORDER — VALSARTAN 320 MG/1
320 TABLET ORAL DAILY
Qty: 30 TABLET | Refills: 1 | Status: SHIPPED | OUTPATIENT
Start: 2024-07-08

## 2024-07-08 NOTE — PROGRESS NOTES
Brian Larry is a 74 y.o. male here today for   Chief Complaint   Patient presents with    Hypertension     HPI     Hypertension recheck  -- pt stopped taking Amlodipine per Ila Patel due to feet swelling.  So now is on Valsartan/hydrochlorothiazide and atenolol only.  No longer taking amlodipine.  BP still elevated.  BP was much better on amlodipine.  Swelling improved since he discontinued amlodipine but still present.  On review he still does have a fair amount of sodium in his diet but he says he is trying to decrease his sodium use.  He denies any shortness of breath or chest pain or heart palpitations.      Current Outpatient Medications:     albuterol 90 mcg/actuation inhaler, Inhale 2 puffs every 6 hours if needed for wheezing., Disp: 18 g, Rfl: 1    atenolol (Tenormin) 100 mg tablet, Take 1 tablet (100 mg) by mouth once daily., Disp: 90 tablet, Rfl: 1    ergocalciferol (Vitamin D-2) 50 MCG (2000 UT) capsule capsule, Take by mouth., Disp: , Rfl:     sertraline (Zoloft) 50 mg tablet, Take 1 tablet (50 mg) by mouth once daily., Disp: 90 tablet, Rfl: 1    chlorthalidone (Hygroton) 25 mg tablet, Take 1 tablet (25 mg) by mouth once daily., Disp: 30 tablet, Rfl: 1    valsartan (Diovan) 320 mg tablet, Take 1 tablet (320 mg) by mouth once daily., Disp: 30 tablet, Rfl: 1    Patient Active Problem List   Diagnosis    Generalized anxiety disorder    Chronic serous otitis media of left ear    Eustachian tube dysfunction    Cutaneous skin tags    Cough    Primary hypertension    Mixed hearing loss of left ear    Malaise    Major depression in remission (CMS-HCC)    Obstructed pressure-equalization (PE) tube    Otorrhea, left ear    Osteoarthritis    Sensorineural hearing loss of right ear    Vitamin D deficiency    Contusion of shoulder region    S/P left knee arthroscopy    Tear of medial meniscus of knee    Nipple pain    Bilateral swelling of feet    Anxiety    Depressive disorder    Hyperlipidemia    Severe  "obesity (Multi)         No results found for this or any previous visit (from the past 672 hour(s)).     Objective    Visit Vitals    Visit Vitals  BP (!) 152/100   Pulse 58   Temp 36.7 °C (98 °F)   Resp 20   Ht 1.727 m (5' 8\")   Wt 120 kg (264 lb)   BMI 40.14 kg/m²   Smoking Status Former   BSA 2.4 m²       Body mass index is 40.14 kg/m².     Physical Exam   General - Not in acute distress and cooperative.  Build & Nutrition - Well developed  Posture - Normal  Gait - Normal  Mental Status - alert and oriented x 3    Head - Normocephalic    Neck - Thyroid normal size    Eyes - Bilateral - Sclera clear and lids pink without edema or mass.      Skin - Warm and dry with no rashes on visible skin    Lungs - Clear to auscultation and normal breathing effort    Cardiovascular - RRR and no murmurs, rubs or thrill.    Peripheral Vascular - Bilateral -1+ edema of the feet and lower half of the lower legs.    Neuropsychiatric - normal mood and affect        Assessment    1. Primary hypertension  chlorthalidone (Hygroton) 25 mg tablet, valsartan (Diovan) 320 mg tablet   His edema has improved since amlodipine was discontinued but his blood pressure is still not well-controlled.  We are going to change his diuretic.  We will discontinue the hydrochlorothiazide and change to chlorthalidone 25 mg daily.  He will continue to take valsartan 320 mg daily and he will continue the same dose of atenolol.  We will follow-up in 1 month for recheck.    He also has a enlarging skin lesion on his right upper arm and we are planning on a shave biopsy to make sure it is not some type of skin cancer in 1 month.         Orders Placed This Encounter      chlorthalidone (Hygroton) 25 mg tablet      valsartan (Diovan) 320 mg tablet       No orders of the defined types were placed in this encounter.       New Medications Ordered This Visit   Medications    chlorthalidone (Hygroton) 25 mg tablet     Sig: Take 1 tablet (25 mg) by mouth once daily.    "  Dispense:  30 tablet     Refill:  1    valsartan (Diovan) 320 mg tablet     Sig: Take 1 tablet (320 mg) by mouth once daily.     Dispense:  30 tablet     Refill:  1

## 2024-08-08 ENCOUNTER — APPOINTMENT (OUTPATIENT)
Dept: PRIMARY CARE | Facility: CLINIC | Age: 74
End: 2024-08-08
Payer: MEDICARE

## 2024-08-08 VITALS
BODY MASS INDEX: 39.16 KG/M2 | TEMPERATURE: 96.6 F | WEIGHT: 258.4 LBS | OXYGEN SATURATION: 95 % | SYSTOLIC BLOOD PRESSURE: 126 MMHG | DIASTOLIC BLOOD PRESSURE: 76 MMHG | HEART RATE: 60 BPM | HEIGHT: 68 IN | RESPIRATION RATE: 18 BRPM

## 2024-08-08 DIAGNOSIS — L98.9 CHANGING SKIN LESION: ICD-10-CM

## 2024-08-08 DIAGNOSIS — I10 PRIMARY HYPERTENSION: Primary | ICD-10-CM

## 2024-08-08 DIAGNOSIS — L91.8 CUTANEOUS SKIN TAGS: ICD-10-CM

## 2024-08-08 PROBLEM — E78.2 MIXED HYPERLIPIDEMIA: Status: ACTIVE | Noted: 2024-07-08

## 2024-08-08 NOTE — PROGRESS NOTES
Brian Larry is a 74 y.o. male here today for   Chief Complaint   Patient presents with    Hypertension    skin biopsy        HPI     Hypertension  -- BP much better now since we changed to chlorthalidone instead of hydrochlorothiazide.  No SE's at all.  He is not having any swelling of his legs.  He reports no orthostatic symptoms.  His home blood pressure readings are usually in the 120s over 70s now.  He denies any chest pain or heart palpitations.    He has a raised enlarging skin lesion of his right lateral bicep area.  It has been present for at least 6 months and is slowly getting bigger.  It is rough and irregular but not really pigmented.    He has a small skin tag of his left anterior neck that has been slowly growing and is irritated at times.          Current Outpatient Medications:     albuterol 90 mcg/actuation inhaler, Inhale 2 puffs every 6 hours if needed for wheezing., Disp: 18 g, Rfl: 1    atenolol (Tenormin) 100 mg tablet, Take 1 tablet (100 mg) by mouth once daily., Disp: 90 tablet, Rfl: 1    chlorthalidone (Hygroton) 25 mg tablet, Take 1 tablet (25 mg) by mouth once daily., Disp: 30 tablet, Rfl: 1    ergocalciferol (Vitamin D-2) 50 MCG (2000 UT) capsule capsule, Take by mouth., Disp: , Rfl:     sertraline (Zoloft) 50 mg tablet, Take 1 tablet (50 mg) by mouth once daily., Disp: 90 tablet, Rfl: 1    valsartan (Diovan) 320 mg tablet, Take 1 tablet (320 mg) by mouth once daily., Disp: 30 tablet, Rfl: 1    Patient Active Problem List   Diagnosis    Generalized anxiety disorder    Chronic serous otitis media of left ear    Eustachian tube dysfunction    Cutaneous skin tags    Cough    Primary hypertension    Mixed hearing loss of left ear    Malaise    Major depression in remission (CMS-HCC)    Obstructed pressure-equalization (PE) tube    Otorrhea, left ear    Osteoarthritis    Sensorineural hearing loss of right ear    Vitamin D deficiency    Contusion of shoulder region    S/P left knee  "arthroscopy    Tear of medial meniscus of knee    Nipple pain    Bilateral swelling of feet    Anxiety    Depressive disorder    Mixed hyperlipidemia    Severe obesity (Multi)    Changing skin lesion         No results found for this or any previous visit (from the past 672 hour(s)).     Objective    Visit Vitals    Visit Vitals  /76   Pulse 60   Temp 35.9 °C (96.6 °F)   Resp 18   Ht 1.727 m (5' 8\")   Wt 117 kg (258 lb 6.4 oz)   SpO2 95%   BMI 39.29 kg/m²   Smoking Status Former   BSA 2.37 m²       Body mass index is 39.29 kg/m².     Physical Exam   General - Not in acute distress and cooperative.  Build & Nutrition - Well developed  Posture - Normal  Gait - Normal  Mental Status - alert and oriented x 3    Head - Normocephalic    Neck - Thyroid normal size    Eyes - Bilateral - Sclera clear and lids pink without edema or mass.      Skin -right lateral bicep with a 6 mm raised and rough skin lesion.  There is not really any significant pigmentation.  There is also a small pigmented skin tag of the left anterior neck.    Lungs - Clear to auscultation and normal breathing effort    Cardiovascular - RRR and no murmurs, rubs or thrill.    Peripheral Vascular - Bilateral - no edema present    Neuropsychiatric - normal mood and affect        Assessment    1. Primary hypertension     Blood pressure now seems well-controlled with his current medications.  No change in current treatment regimen.  Patient reports no refills are needed today.  Make a follow up appointment with me for recheck in 6 months.     2. Cutaneous skin tags     Using sterile technique I did a snip removal of the small solitary skin tag of the left anterior neck and then cauterized with a silver nitrate stick.  Patient tolerated well.  No pathology sent.     3. Changing skin lesion          Shaving Epidermal/Dermal    Date/Time: 8/8/2024 12:54 PM    Performed by: Zion Bernstein MD  Authorized by: Zion Bernstein MD    Number of Lesions: 1  Lesion " 1:     Body area: upper extremity    Upper extremity location: R upper arm    Initial size (mm): 6    Malignancy: malignancy unknown      Comments:  Shave biopsy of the 6 mm enlarging skin lesion of the right lateral bicep.  We discussed risks versus benefits of procedure and verbal consent was given.  Sterile technique was used throughout.  Area was prepped with Betadine and alcohol.  Anesthetized with 2% lidocaine with epinephrine.  0.2 mL used.  Shave biopsy was performed and a silver nitrate stick was applied for hemostasis.  Pathology was sent.  The area was dressed with antibiotic ointment and a Band-Aid.  Patient tolerated the procedure very well with no complications.  We will call him with pathology results in 1 to 2 weeks.  If he has not heard from us in 2 weeks he should call us for pathology.              No orders of the defined types were placed in this encounter.       No orders of the defined types were placed in this encounter.

## 2024-08-27 DIAGNOSIS — I10 PRIMARY HYPERTENSION: ICD-10-CM

## 2024-08-27 RX ORDER — CHLORTHALIDONE 25 MG/1
25 TABLET ORAL DAILY
Qty: 90 TABLET | Refills: 1 | Status: SHIPPED | OUTPATIENT
Start: 2024-08-27

## 2024-08-27 RX ORDER — VALSARTAN 320 MG/1
320 TABLET ORAL DAILY
Qty: 90 TABLET | Refills: 1 | Status: SHIPPED | OUTPATIENT
Start: 2024-08-27

## 2024-09-19 ENCOUNTER — CLINICAL SUPPORT (OUTPATIENT)
Dept: PRIMARY CARE | Facility: CLINIC | Age: 74
End: 2024-09-19
Payer: MEDICARE

## 2024-09-19 DIAGNOSIS — Z23 NEED FOR INFLUENZA VACCINATION: ICD-10-CM

## 2024-09-19 PROCEDURE — G0008 ADMIN INFLUENZA VIRUS VAC: HCPCS | Performed by: FAMILY MEDICINE

## 2024-09-19 PROCEDURE — 90662 IIV NO PRSV INCREASED AG IM: CPT | Performed by: FAMILY MEDICINE

## 2024-09-19 NOTE — PROGRESS NOTES
Brian Larry is a 74 y.o. male here today for   Chief Complaint   Patient presents with    Flu Vaccine        HPI         Current Outpatient Medications:     albuterol 90 mcg/actuation inhaler, Inhale 2 puffs every 6 hours if needed for wheezing., Disp: 18 g, Rfl: 1    atenolol (Tenormin) 100 mg tablet, Take 1 tablet (100 mg) by mouth once daily., Disp: 90 tablet, Rfl: 1    chlorthalidone (Hygroton) 25 mg tablet, Take 1 tablet (25 mg) by mouth once daily., Disp: 90 tablet, Rfl: 1    ergocalciferol (Vitamin D-2) 50 MCG (2000 UT) capsule capsule, Take by mouth., Disp: , Rfl:     sertraline (Zoloft) 50 mg tablet, Take 1 tablet (50 mg) by mouth once daily., Disp: 90 tablet, Rfl: 1    valsartan (Diovan) 320 mg tablet, Take 1 tablet (320 mg) by mouth once daily., Disp: 90 tablet, Rfl: 1    Patient Active Problem List   Diagnosis    Generalized anxiety disorder    Chronic serous otitis media of left ear    Eustachian tube dysfunction    Cutaneous skin tags    Cough    Primary hypertension    Mixed hearing loss of left ear    Malaise    Major depression in remission (CMS-HCC)    Obstructed pressure-equalization (PE) tube    Otorrhea, left ear    Osteoarthritis    Sensorineural hearing loss of right ear    Vitamin D deficiency    Contusion of shoulder region    S/P left knee arthroscopy    Tear of medial meniscus of knee    Nipple pain    Bilateral swelling of feet    Anxiety    Depressive disorder    Mixed hyperlipidemia    Severe obesity (Multi)    Changing skin lesion         No results found for this or any previous visit (from the past 672 hour(s)).     Objective    Visit Vitals    Visit Vitals  Smoking Status Former       There is no height or weight on file to calculate BMI.     Physical Exam       Assessment    1. Need for influenza vaccination  Flu vaccine, trivalent, preservative free, HIGH-DOSE, age 65y+ (Fluzone)                  Orders Placed This Encounter   Procedures    Flu vaccine, trivalent, preservative  free, HIGH-DOSE, age 65y+ (Fluzone)        No orders of the defined types were placed in this encounter.

## 2024-11-26 DIAGNOSIS — F41.1 GENERALIZED ANXIETY DISORDER: ICD-10-CM

## 2024-11-26 DIAGNOSIS — I10 PRIMARY HYPERTENSION: ICD-10-CM

## 2024-11-26 RX ORDER — ATENOLOL 100 MG/1
100 TABLET ORAL DAILY
Qty: 90 TABLET | Refills: 1 | OUTPATIENT
Start: 2024-11-26

## 2024-11-26 RX ORDER — SERTRALINE HYDROCHLORIDE 50 MG/1
50 TABLET, FILM COATED ORAL DAILY
Qty: 90 TABLET | Refills: 1 | OUTPATIENT
Start: 2024-11-26

## 2024-12-10 ENCOUNTER — APPOINTMENT (OUTPATIENT)
Dept: PRIMARY CARE | Facility: CLINIC | Age: 74
End: 2024-12-10
Payer: MEDICARE

## 2024-12-10 VITALS
TEMPERATURE: 94.4 F | SYSTOLIC BLOOD PRESSURE: 132 MMHG | WEIGHT: 259 LBS | HEIGHT: 68 IN | BODY MASS INDEX: 39.25 KG/M2 | HEART RATE: 67 BPM | RESPIRATION RATE: 18 BRPM | DIASTOLIC BLOOD PRESSURE: 84 MMHG

## 2024-12-10 DIAGNOSIS — Z00.00 ROUTINE GENERAL MEDICAL EXAMINATION AT HEALTH CARE FACILITY: Primary | ICD-10-CM

## 2024-12-10 DIAGNOSIS — F32.5 MAJOR DEPRESSION IN REMISSION (CMS-HCC): ICD-10-CM

## 2024-12-10 DIAGNOSIS — Z00.00 ENCOUNTER FOR ANNUAL WELLNESS EXAM IN MEDICARE PATIENT: ICD-10-CM

## 2024-12-10 DIAGNOSIS — I10 PRIMARY HYPERTENSION: ICD-10-CM

## 2024-12-10 DIAGNOSIS — F41.1 GENERALIZED ANXIETY DISORDER: ICD-10-CM

## 2024-12-10 DIAGNOSIS — Z12.5 SCREENING FOR PROSTATE CANCER: ICD-10-CM

## 2024-12-10 DIAGNOSIS — E55.9 VITAMIN D DEFICIENCY: ICD-10-CM

## 2024-12-10 PROCEDURE — G0439 PPPS, SUBSEQ VISIT: HCPCS | Performed by: FAMILY MEDICINE

## 2024-12-10 PROCEDURE — 1036F TOBACCO NON-USER: CPT | Performed by: FAMILY MEDICINE

## 2024-12-10 PROCEDURE — 1159F MED LIST DOCD IN RCRD: CPT | Performed by: FAMILY MEDICINE

## 2024-12-10 PROCEDURE — 3079F DIAST BP 80-89 MM HG: CPT | Performed by: FAMILY MEDICINE

## 2024-12-10 PROCEDURE — 99213 OFFICE O/P EST LOW 20 MIN: CPT | Performed by: FAMILY MEDICINE

## 2024-12-10 PROCEDURE — 3008F BODY MASS INDEX DOCD: CPT | Performed by: FAMILY MEDICINE

## 2024-12-10 PROCEDURE — 1160F RVW MEDS BY RX/DR IN RCRD: CPT | Performed by: FAMILY MEDICINE

## 2024-12-10 PROCEDURE — 3075F SYST BP GE 130 - 139MM HG: CPT | Performed by: FAMILY MEDICINE

## 2024-12-10 PROCEDURE — 1123F ACP DISCUSS/DSCN MKR DOCD: CPT | Performed by: FAMILY MEDICINE

## 2024-12-10 PROCEDURE — 1170F FXNL STATUS ASSESSED: CPT | Performed by: FAMILY MEDICINE

## 2024-12-10 RX ORDER — SERTRALINE HYDROCHLORIDE 50 MG/1
50 TABLET, FILM COATED ORAL DAILY
Qty: 90 TABLET | Refills: 1 | Status: SHIPPED | OUTPATIENT
Start: 2024-12-10

## 2024-12-10 RX ORDER — ATENOLOL 100 MG/1
100 TABLET ORAL DAILY
Qty: 90 TABLET | Refills: 1 | Status: SHIPPED | OUTPATIENT
Start: 2024-12-10

## 2024-12-10 ASSESSMENT — ACTIVITIES OF DAILY LIVING (ADL)
GROCERY_SHOPPING: INDEPENDENT
BATHING: INDEPENDENT
TAKING_MEDICATION: INDEPENDENT
DOING_HOUSEWORK: INDEPENDENT
MANAGING_FINANCES: INDEPENDENT
DRESSING: INDEPENDENT

## 2024-12-10 NOTE — ASSESSMENT & PLAN NOTE
Condition well controlled.  No change in current treatment regimen.  Refill given of current medication.  Appropriate labs ordered or reviewed.  Make a follow up appointment with me for recheck in 6 months.  Orders:    atenolol (Tenormin) 100 mg tablet; Take 1 tablet (100 mg) by mouth once daily.    Comprehensive Metabolic Panel; Future    CBC; Future    Lipid Panel; Future

## 2024-12-10 NOTE — ASSESSMENT & PLAN NOTE
Appropriate labs ordered or reviewed.  Orders:    Vitamin D 25-Hydroxy,Total (for eval of Vitamin D levels); Future

## 2024-12-10 NOTE — PROGRESS NOTES
History Of Present Illness  Brian Larry is a 74 y.o. male presenting for a Medicare Annual Wellness Exam.  Patient is here for a periodic health exam.  I reviewed previous preventative health measures including screening tests, immunizations and labs.  I reviewed screenings administered by my staff today.       PREVIOUS PREVENTATIVE HEALTH    Colonoscopy : Yes    Date: 8/26/2024  Cologuard : No  Prostate cancer screening with PSA : Yes 12/19/2023  Hepatitis C Antibody : Yes 12/19/2023  Prevnar : Yes 7/21/2016  Shingrix : Yes 6/22/2012 Zostavax  Tdap within 10 years : Yes 1/23/2023  Yearly Flu Shot : Yes      CURRENT FINDINGS    Falls : No  Problems with ADL's :  No  Healthy Diet : Yes  Exercise :  No  Vision or Hearing Problems : Yes but he has hearing aids which help a lot.  Depression Issues : Yes  Alcohol Use : Yes  Tobacco Use : Previous.  Smoked for a total of 7 years and quit 25 years ago.      HTN recheck -- Patient denies chest pain, SOB, edema, palpitations on review.  Taking medication correctly and denies any side effects.       Mood disorder recheck -- Patient feels like condition is well controlled.  Has good control of mood and emotional reactions.  No SE's or problems with medications.  No homicidal or suicidal ideation.  Patient wishes to continue same medications.             Past Medical History  Patient Active Problem List   Diagnosis    Generalized anxiety disorder    Chronic serous otitis media of left ear    Eustachian tube dysfunction    Cutaneous skin tags    Cough    Primary hypertension    Mixed hearing loss of left ear    Malaise    Major depression in remission (CMS-HCC)    Obstructed pressure-equalization (PE) tube    Otorrhea, left ear    Osteoarthritis    Sensorineural hearing loss of right ear    Vitamin D deficiency    Contusion of shoulder region    S/P left knee arthroscopy    Tear of medial meniscus of knee    Nipple pain    Bilateral swelling of feet    Anxiety     Depressive disorder    Mixed hyperlipidemia    Severe obesity (Multi)    Changing skin lesion       Past Surgical History:   Procedure Laterality Date    OTHER SURGICAL HISTORY  12/19/2019    Vasectomy    OTHER SURGICAL HISTORY  12/19/2019    Ear pressure equalization tube insertion bilateral        Current Outpatient Medications:     albuterol 90 mcg/actuation inhaler, Inhale 2 puffs every 6 hours if needed for wheezing., Disp: 18 g, Rfl: 1    chlorthalidone (Hygroton) 25 mg tablet, Take 1 tablet (25 mg) by mouth once daily., Disp: 90 tablet, Rfl: 1    ergocalciferol (Vitamin D-2) 50 MCG (2000 UT) capsule capsule, Take by mouth., Disp: , Rfl:     valsartan (Diovan) 320 mg tablet, Take 1 tablet (320 mg) by mouth once daily., Disp: 90 tablet, Rfl: 1    atenolol (Tenormin) 100 mg tablet, Take 1 tablet (100 mg) by mouth once daily., Disp: 90 tablet, Rfl: 1    sertraline (Zoloft) 50 mg tablet, Take 1 tablet (50 mg) by mouth once daily., Disp: 90 tablet, Rfl: 1   Immunization History   Administered Date(s) Administered    COVID-19, mRNA, LNP-S, PF, 30 mcg/0.3 mL dose 03/08/2021, 04/05/2021, 10/04/2021    Flu vaccine, quadrivalent, high-dose, preservative free, age 65y+ (FLUZONE) 10/09/2023    Flu vaccine, trivalent, preservative free, HIGH-DOSE, age 65y+ (Fluzone) 09/29/2016, 12/08/2017, 09/19/2024    Influenza, seasonal, injectable 12/21/2011    Influenza, trivalent, adjuvanted 10/03/2018    Pfizer COVID-19 vaccine, bivalent, age 12 years and older (30 mcg/0.3 mL) 09/20/2022    Pfizer Gray Cap SARS-CoV-2 04/25/2022    Pneumococcal conjugate vaccine, 13-valent (PREVNAR 13) 07/21/2016    Pneumococcal polysaccharide vaccine, 23-valent, age 2 years and older (PNEUMOVAX 23) 07/23/2014, 12/16/2021    Tdap vaccine, age 7 year and older (BOOSTRIX, ADACEL) 06/22/2012, 01/23/2023    Varicella vaccine, subcutaneous (VARIVAX) 06/22/2012    Zoster, live 06/22/2012        Social History  Social History     Socioeconomic History     "Marital status:      Spouse name: Not on file    Number of children: Not on file    Years of education: Not on file    Highest education level: Not on file   Occupational History    Not on file   Tobacco Use    Smoking status: Former     Types: Cigarettes     Passive exposure: Never    Smokeless tobacco: Never   Vaping Use    Vaping status: Former   Substance and Sexual Activity    Alcohol use: Yes    Drug use: Never    Sexual activity: Not on file   Other Topics Concern    Not on file   Social History Narrative    Not on file     Social Drivers of Health     Financial Resource Strain: Not on file   Food Insecurity: Not on file   Transportation Needs: Not on file   Physical Activity: Not on file   Stress: Not on file   Social Connections: Not on file   Intimate Partner Violence: Not on file   Housing Stability: Not on file        reports current alcohol use.    reports that he has quit smoking. His smoking use included cigarettes. He has never been exposed to tobacco smoke. He has never used smokeless tobacco.    reports no history of drug use.           Allergies  Patient has no known allergies.      Physical Exam  Visit Vitals  /84   Pulse 67   Temp 34.7 °C (94.4 °F)   Resp 18   Ht 1.727 m (5' 8\")   Wt 117 kg (259 lb)   BMI 39.38 kg/m²   Smoking Status Former   BSA 2.37 m²     Body mass index is 39.38 kg/m².    Physical Exam  Vitals and nursing note reviewed.   Constitutional:       General: He is not in acute distress.     Appearance: Normal appearance.   HENT:      Head: Normocephalic and atraumatic.      Right Ear: Tympanic membrane, ear canal and external ear normal.      Left Ear: Tympanic membrane, ear canal and external ear normal.      Nose: Nose normal.      Mouth/Throat:      Mouth: Mucous membranes are moist.      Pharynx: Oropharynx is clear.   Eyes:      Extraocular Movements: Extraocular movements intact.      Conjunctiva/sclera: Conjunctivae normal.      Pupils: Pupils are equal, " round, and reactive to light.   Cardiovascular:      Rate and Rhythm: Normal rate and regular rhythm.      Pulses: Normal pulses.      Heart sounds: Normal heart sounds. No murmur heard.     No friction rub. No gallop.   Pulmonary:      Effort: Pulmonary effort is normal. No respiratory distress.      Breath sounds: Normal breath sounds.   Abdominal:      General: Abdomen is flat. Bowel sounds are normal. There is no distension.      Palpations: Abdomen is soft.      Tenderness: There is no abdominal tenderness.   Musculoskeletal:         General: Normal range of motion.      Cervical back: Normal range of motion and neck supple.   Lymphadenopathy:      Cervical: No cervical adenopathy.   Skin:     General: Skin is warm and dry.      Findings: No lesion or rash.   Neurological:      General: No focal deficit present.      Mental Status: He is alert. Mental status is at baseline.   Psychiatric:         Mood and Affect: Mood normal.         Behavior: Behavior normal.         Thought Content: Thought content normal.         Judgment: Judgment normal.                 Assessment      Assessment & Plan  Encounter for annual wellness exam in Medicare patient         Primary hypertension  Condition well controlled.  No change in current treatment regimen.  Refill given of current medication.  Appropriate labs ordered or reviewed.  Make a follow up appointment with me for recheck in 6 months.  Orders:    atenolol (Tenormin) 100 mg tablet; Take 1 tablet (100 mg) by mouth once daily.    Comprehensive Metabolic Panel; Future    CBC; Future    Lipid Panel; Future    Generalized anxiety disorder  Condition well controlled.  No change in current treatment regimen.  Refill given of current medication.  Make a follow up appointment with me for recheck in 6 months.  Orders:    sertraline (Zoloft) 50 mg tablet; Take 1 tablet (50 mg) by mouth once daily.    Major depression in remission (CMS-HCC)         Vitamin D  deficiency  Appropriate labs ordered or reviewed.  Orders:    Vitamin D 25-Hydroxy,Total (for eval of Vitamin D levels); Future    Routine general medical examination at health care facility    Orders:    1 Year Follow Up In [facility] - PCP - Wellness Exam; Future    Screening for prostate cancer    Orders:    PSA screen; Future         I recommend regular exercise, balanced diet, regular dental exams, and healthy habits.  Patient denies depression sxs.  Patient is able to perform all ADL's without assistance.  I reminded patient to get yearly eye exams with glaucoma screening.  I recommend to eat plenty of plant foods (such as whole-grain products, fruits, and vegetables) and a moderate amount of lean and low-fat, animal-based food (meat and dairy products).  When shopping, choose lean meats, fish, and poultry. I recommend to try to get regular aerobic exercise.  I recommend a yearly flu shot in the fall and I recommend a yearly wellness exam.      Orders Placed This Encounter      atenolol (Tenormin) 100 mg tablet      sertraline (Zoloft) 50 mg tablet       Orders Placed This Encounter   Procedures    Comprehensive Metabolic Panel    CBC    Lipid Panel    Vitamin D 25-Hydroxy,Total (for eval of Vitamin D levels)    PSA screen        New Medications Ordered This Visit   Medications    atenolol (Tenormin) 100 mg tablet     Sig: Take 1 tablet (100 mg) by mouth once daily.     Dispense:  90 tablet     Refill:  1    sertraline (Zoloft) 50 mg tablet     Sig: Take 1 tablet (50 mg) by mouth once daily.     Dispense:  90 tablet     Refill:  1                   Zion Bernstein MD

## 2024-12-10 NOTE — ASSESSMENT & PLAN NOTE
Condition well controlled.  No change in current treatment regimen.  Refill given of current medication.  Make a follow up appointment with me for recheck in 6 months.  Orders:    sertraline (Zoloft) 50 mg tablet; Take 1 tablet (50 mg) by mouth once daily.

## 2024-12-11 ENCOUNTER — LAB (OUTPATIENT)
Dept: LAB | Facility: LAB | Age: 74
End: 2024-12-11
Payer: MEDICARE

## 2024-12-11 ENCOUNTER — TELEPHONE (OUTPATIENT)
Dept: PRIMARY CARE | Facility: CLINIC | Age: 74
End: 2024-12-11

## 2024-12-11 DIAGNOSIS — Z12.5 SCREENING FOR PROSTATE CANCER: ICD-10-CM

## 2024-12-11 DIAGNOSIS — I10 PRIMARY HYPERTENSION: ICD-10-CM

## 2024-12-11 DIAGNOSIS — E55.9 VITAMIN D DEFICIENCY: ICD-10-CM

## 2024-12-11 LAB
25(OH)D3 SERPL-MCNC: 22 NG/ML (ref 30–100)
ALBUMIN SERPL BCP-MCNC: 4.4 G/DL (ref 3.4–5)
ALP SERPL-CCNC: 53 U/L (ref 33–136)
ALT SERPL W P-5'-P-CCNC: 27 U/L (ref 10–52)
ANION GAP SERPL CALC-SCNC: 14 MMOL/L (ref 10–20)
AST SERPL W P-5'-P-CCNC: 21 U/L (ref 9–39)
BILIRUB SERPL-MCNC: 0.5 MG/DL (ref 0–1.2)
BUN SERPL-MCNC: 18 MG/DL (ref 6–23)
CALCIUM SERPL-MCNC: 9.5 MG/DL (ref 8.6–10.3)
CHLORIDE SERPL-SCNC: 100 MMOL/L (ref 98–107)
CHOLEST SERPL-MCNC: 175 MG/DL (ref 0–199)
CHOLESTEROL/HDL RATIO: 5.3
CO2 SERPL-SCNC: 29 MMOL/L (ref 21–32)
CREAT SERPL-MCNC: 0.97 MG/DL (ref 0.5–1.3)
EGFRCR SERPLBLD CKD-EPI 2021: 82 ML/MIN/1.73M*2
ERYTHROCYTE [DISTWIDTH] IN BLOOD BY AUTOMATED COUNT: 13.5 % (ref 11.5–14.5)
GLUCOSE SERPL-MCNC: 109 MG/DL (ref 74–99)
HCT VFR BLD AUTO: 44 % (ref 41–52)
HDLC SERPL-MCNC: 33.3 MG/DL
HGB BLD-MCNC: 14.8 G/DL (ref 13.5–17.5)
LDLC SERPL CALC-MCNC: 95 MG/DL
MCH RBC QN AUTO: 30 PG (ref 26–34)
MCHC RBC AUTO-ENTMCNC: 33.6 G/DL (ref 32–36)
MCV RBC AUTO: 89 FL (ref 80–100)
NON HDL CHOLESTEROL: 142 MG/DL (ref 0–149)
NRBC BLD-RTO: 0 /100 WBCS (ref 0–0)
PLATELET # BLD AUTO: 198 X10*3/UL (ref 150–450)
POTASSIUM SERPL-SCNC: 3.6 MMOL/L (ref 3.5–5.3)
PROT SERPL-MCNC: 7.6 G/DL (ref 6.4–8.2)
PSA SERPL-MCNC: 1.19 NG/ML
RBC # BLD AUTO: 4.94 X10*6/UL (ref 4.5–5.9)
SODIUM SERPL-SCNC: 139 MMOL/L (ref 136–145)
TRIGL SERPL-MCNC: 236 MG/DL (ref 0–149)
VLDL: 47 MG/DL (ref 0–40)
WBC # BLD AUTO: 7.6 X10*3/UL (ref 4.4–11.3)

## 2024-12-11 PROCEDURE — 85027 COMPLETE CBC AUTOMATED: CPT

## 2024-12-11 PROCEDURE — 80061 LIPID PANEL: CPT

## 2024-12-11 PROCEDURE — G0103 PSA SCREENING: HCPCS

## 2024-12-11 PROCEDURE — 36415 COLL VENOUS BLD VENIPUNCTURE: CPT

## 2024-12-11 PROCEDURE — 80053 COMPREHEN METABOLIC PANEL: CPT

## 2024-12-11 PROCEDURE — 82306 VITAMIN D 25 HYDROXY: CPT

## 2024-12-11 NOTE — TELEPHONE ENCOUNTER
Patient informed of results. He verbalized understanding and all questions and concerns answered at this time. He reports he is taking 2000U daily and will start taking 4000U daily of vitamin D.

## 2024-12-11 NOTE — RESULT ENCOUNTER NOTE
Inform the patient his vitamin D is still low at 22.  If he is not taking a supplement then he should start taking over-the-counter 2000 units daily.  If he is already taking a vitamin D supplement then he should double the dose.  I would like to recheck his vitamin D level in 3 months.  Please order this lab.  The rest of his labs were all essentially normal.

## 2024-12-11 NOTE — TELEPHONE ENCOUNTER
----- Message from Zion Bernstein sent at 12/11/2024 12:44 PM EST -----  Inform the patient his vitamin D is still low at 22.  If he is not taking a supplement then he should start taking over-the-counter 2000 units daily.  If he is already taking a vitamin D supplement then he should double the dose.  I would like to recheck his vitamin D level in 3 months.  Please order this lab.  The rest of his labs were all essentially normal.

## 2025-02-24 DIAGNOSIS — I10 PRIMARY HYPERTENSION: ICD-10-CM

## 2025-02-25 RX ORDER — VALSARTAN 320 MG/1
320 TABLET ORAL DAILY
Qty: 90 TABLET | Refills: 1 | Status: SHIPPED | OUTPATIENT
Start: 2025-02-25

## 2025-03-05 DIAGNOSIS — I10 PRIMARY HYPERTENSION: ICD-10-CM

## 2025-03-05 RX ORDER — CHLORTHALIDONE 25 MG/1
25 TABLET ORAL DAILY
Qty: 90 TABLET | Refills: 1 | Status: SHIPPED | OUTPATIENT
Start: 2025-03-05

## 2025-04-02 NOTE — TELEPHONE ENCOUNTER
----- Message from Zion Bernstein MD sent at 6/22/2023  7:54 AM EDT -----  Inform patient of normal results of all recent labs.   Even though some of the lab values may fall outside of the normal range, I do not feel they are clinically concerning or relevant at this time.     Awake/Alert

## 2025-06-10 ENCOUNTER — APPOINTMENT (OUTPATIENT)
Dept: PRIMARY CARE | Facility: CLINIC | Age: 75
End: 2025-06-10
Payer: MEDICARE

## 2025-06-10 VITALS
WEIGHT: 258 LBS | TEMPERATURE: 97 F | HEART RATE: 70 BPM | BODY MASS INDEX: 39.1 KG/M2 | SYSTOLIC BLOOD PRESSURE: 122 MMHG | HEIGHT: 68 IN | DIASTOLIC BLOOD PRESSURE: 84 MMHG | RESPIRATION RATE: 18 BRPM

## 2025-06-10 DIAGNOSIS — I10 PRIMARY HYPERTENSION: ICD-10-CM

## 2025-06-10 DIAGNOSIS — F41.1 GENERALIZED ANXIETY DISORDER: ICD-10-CM

## 2025-06-10 DIAGNOSIS — E78.2 MIXED DYSLIPIDEMIA: ICD-10-CM

## 2025-06-10 DIAGNOSIS — M25.551 BILATERAL HIP PAIN: ICD-10-CM

## 2025-06-10 DIAGNOSIS — M25.552 BILATERAL HIP PAIN: ICD-10-CM

## 2025-06-10 DIAGNOSIS — F32.5 MAJOR DEPRESSION IN REMISSION: Primary | ICD-10-CM

## 2025-06-10 PROBLEM — R05.9 COUGH: Status: RESOLVED | Noted: 2023-03-21 | Resolved: 2025-06-10

## 2025-06-10 PROBLEM — F32.A DEPRESSIVE DISORDER: Status: RESOLVED | Noted: 2024-07-08 | Resolved: 2025-06-10

## 2025-06-10 PROBLEM — E66.01 SEVERE OBESITY (MULTI): Status: RESOLVED | Noted: 2024-07-08 | Resolved: 2025-06-10

## 2025-06-10 PROCEDURE — G2211 COMPLEX E/M VISIT ADD ON: HCPCS | Performed by: FAMILY MEDICINE

## 2025-06-10 PROCEDURE — 1159F MED LIST DOCD IN RCRD: CPT | Performed by: FAMILY MEDICINE

## 2025-06-10 PROCEDURE — 99214 OFFICE O/P EST MOD 30 MIN: CPT | Performed by: FAMILY MEDICINE

## 2025-06-10 PROCEDURE — 3008F BODY MASS INDEX DOCD: CPT | Performed by: FAMILY MEDICINE

## 2025-06-10 PROCEDURE — 1123F ACP DISCUSS/DSCN MKR DOCD: CPT | Performed by: FAMILY MEDICINE

## 2025-06-10 PROCEDURE — 1160F RVW MEDS BY RX/DR IN RCRD: CPT | Performed by: FAMILY MEDICINE

## 2025-06-10 PROCEDURE — 3079F DIAST BP 80-89 MM HG: CPT | Performed by: FAMILY MEDICINE

## 2025-06-10 PROCEDURE — 1036F TOBACCO NON-USER: CPT | Performed by: FAMILY MEDICINE

## 2025-06-10 PROCEDURE — 3074F SYST BP LT 130 MM HG: CPT | Performed by: FAMILY MEDICINE

## 2025-06-10 RX ORDER — ATORVASTATIN CALCIUM 20 MG/1
20 TABLET, FILM COATED ORAL DAILY
Qty: 90 TABLET | Refills: 1 | Status: SHIPPED | OUTPATIENT
Start: 2025-06-10

## 2025-06-10 RX ORDER — LATANOPROST 50 UG/ML
1 SOLUTION/ DROPS OPHTHALMIC NIGHTLY
COMMUNITY

## 2025-06-10 RX ORDER — SERTRALINE HYDROCHLORIDE 50 MG/1
50 TABLET, FILM COATED ORAL DAILY
Qty: 90 TABLET | Refills: 1 | Status: SHIPPED | OUTPATIENT
Start: 2025-06-10

## 2025-06-10 RX ORDER — ATENOLOL 100 MG/1
100 TABLET ORAL DAILY
Qty: 90 TABLET | Refills: 1 | Status: SHIPPED | OUTPATIENT
Start: 2025-06-10

## 2025-06-10 ASSESSMENT — PATIENT HEALTH QUESTIONNAIRE - PHQ9
1. LITTLE INTEREST OR PLEASURE IN DOING THINGS: NOT AT ALL
2. FEELING DOWN, DEPRESSED OR HOPELESS: NOT AT ALL
SUM OF ALL RESPONSES TO PHQ9 QUESTIONS 1 AND 2: 0

## 2025-06-10 NOTE — PROGRESS NOTES
Brian Larry is a 75 y.o. male here today for   Chief Complaint   Patient presents with    Hypertension        HPI     HTN recheck -- Patient denies chest pain, SOB, edema, palpitations on review.  Taking medication correctly and denies any side effects.  Has been good when he checks at home every other day.      Mood disorder recheck -- Patient feels like condition is well controlled.  Has good control of mood and emotional reactions.  No SE's or problems with medications.  No homicidal or suicidal ideation.  Patient wishes to continue same medications.      He is getting B hip pain with walking over 1/2 mile.  Pain over lateral hips.  Present for years but getting worse.      He has a history of dyslipidemia with low HDL and elevated triglycerides.  His 10-year ASCVD risk is 30.5% but he has never been on a cholesterol medication.    Current Outpatient Medications   Medication Instructions    albuterol 90 mcg/actuation inhaler 2 puffs, inhalation, Every 6 hours PRN    atenolol (TENORMIN) 100 mg, oral, Daily    atorvastatin (LIPITOR) 20 mg, oral, Daily    chlorthalidone (HYGROTON) 25 mg, oral, Daily    ergocalciferol (Vitamin D-2) 50 MCG (2000 UT) capsule capsule Take by mouth.    latanoprost (Xalatan) 0.005 % ophthalmic solution 1 drop, Nightly    sertraline (ZOLOFT) 50 mg, oral, Daily    valsartan (DIOVAN) 320 mg, oral, Daily       Patient Active Problem List    Diagnosis Date Noted    Mixed dyslipidemia 07/08/2024    Generalized anxiety disorder 03/21/2023    Primary hypertension 03/21/2023    Major depression in remission 03/21/2023    Changing skin lesion 08/08/2024    Anxiety 07/08/2024    Bilateral swelling of feet 06/27/2024    Nipple pain 06/05/2024    Chronic serous otitis media of left ear 03/21/2023    Eustachian tube dysfunction 03/21/2023    Cutaneous skin tags 03/21/2023    Mixed hearing loss of left ear 03/21/2023    Malaise 03/21/2023    Obstructed pressure-equalization (PE) tube 03/21/2023     "Otorrhea, left ear 03/21/2023    Osteoarthritis 03/21/2023    Sensorineural hearing loss of right ear 03/21/2023    Vitamin D deficiency 03/21/2023    S/P left knee arthroscopy 09/18/2015    Tear of medial meniscus of knee 08/07/2015    Contusion of shoulder region 09/19/2007         Objective      Visit Vitals    Visit Vitals  /84   Pulse 70   Temp 36.1 °C (97 °F)   Resp 18   Ht 1.727 m (5' 8\")   Wt 117 kg (258 lb)   BMI 39.23 kg/m²   Smoking Status Former   BSA 2.37 m²       Body mass index is 39.23 kg/m².     Physical Exam     General - Not in acute distress and cooperative.  Build & Nutrition - Well developed  Posture - Normal  Gait - Normal  Mental Status - alert and oriented x 3    Head - Normocephalic    Neck - Thyroid normal size    Eyes - Bilateral - Sclera clear and lids pink without edema or mass.      Skin - Warm and dry with no rashes on visible skin    Lungs - Clear to auscultation and normal breathing effort    Cardiovascular - RRR and no murmurs, rubs or thrill.    Peripheral Vascular - Bilateral - no edema present    Neuropsychiatric - normal mood and affect        Assessment & Plan  Primary hypertension  Condition well controlled.  No change in current treatment regimen.  Refill given of current medication.  Appropriate labs ordered or reviewed.  Make a follow up appointment with me for recheck in 6 months.  Orders:    atenolol (Tenormin) 100 mg tablet; Take 1 tablet (100 mg) by mouth once daily.    Comprehensive Metabolic Panel; Future    CBC; Future    Lipid Panel; Future    Generalized anxiety disorder  Condition well controlled.  No change in current treatment regimen.  Refill given of current medication.  Make a follow up appointment with me for recheck in 6 months.  Orders:    sertraline (Zoloft) 50 mg tablet; Take 1 tablet (50 mg) by mouth once daily.    Major depression in remission  Condition well controlled.  No change in current treatment regimen.  Refill given of current " medication.  Make a follow up appointment with me for recheck in 6 months.  Orders:    sertraline (Zoloft) 50 mg tablet; Take 1 tablet (50 mg) by mouth once daily.    Bilateral hip pain  I suspect he has arthritis of his bilateral hips.  He says his sister and father have severe arthritis in their hips.  I am going to get an x-ray for further evaluation.  Orders:    XR hips bilateral 2 VW w pelvis when performed; Future    Mixed dyslipidemia  Patient is at elevated risk for atherosclerosis and the consensus recommendation would be to start a cholesterol-lowering agent at this point.  We discussed the risk versus benefit of this medication and potential side effects.  We are going to start atorvastatin 20 mg daily and we will recheck labs in 2 months and call him with results.  I recommend to eat plenty of plant foods (such as whole-grain products, fruits, and vegetables) and a moderate amount of lean and low-fat, animal-based food (meat and dairy products).  When shopping, choose lean meats, fish, and poultry. I recommend to increase aerobic exercise.    Orders:    atorvastatin (Lipitor) 20 mg tablet; Take 1 tablet (20 mg) by mouth once daily.    Comprehensive Metabolic Panel; Future    CBC; Future    Lipid Panel; Future         Orders Placed This Encounter      atenolol (Tenormin) 100 mg tablet      atorvastatin (Lipitor) 20 mg tablet      sertraline (Zoloft) 50 mg tablet       Orders Placed This Encounter   Procedures    XR hips bilateral 2 VW w pelvis when performed    Comprehensive Metabolic Panel    CBC    Lipid Panel        New Medications Ordered This Visit   Medications    latanoprost (Xalatan) 0.005 % ophthalmic solution     Si drop once daily at bedtime.    atenolol (Tenormin) 100 mg tablet     Sig: Take 1 tablet (100 mg) by mouth once daily.     Dispense:  90 tablet     Refill:  1    sertraline (Zoloft) 50 mg tablet     Sig: Take 1 tablet (50 mg) by mouth once daily.     Dispense:  90 tablet      Refill:  1    atorvastatin (Lipitor) 20 mg tablet     Sig: Take 1 tablet (20 mg) by mouth once daily.     Dispense:  90 tablet     Refill:  1

## 2025-06-10 NOTE — ASSESSMENT & PLAN NOTE
Patient is at elevated risk for atherosclerosis and the consensus recommendation would be to start a cholesterol-lowering agent at this point.  We discussed the risk versus benefit of this medication and potential side effects.  We are going to start atorvastatin 20 mg daily and we will recheck labs in 2 months and call him with results.  I recommend to eat plenty of plant foods (such as whole-grain products, fruits, and vegetables) and a moderate amount of lean and low-fat, animal-based food (meat and dairy products).  When shopping, choose lean meats, fish, and poultry. I recommend to increase aerobic exercise.    Orders:    atorvastatin (Lipitor) 20 mg tablet; Take 1 tablet (20 mg) by mouth once daily.    Comprehensive Metabolic Panel; Future    CBC; Future    Lipid Panel; Future     Well baby, under 8 days old

## 2025-06-12 ENCOUNTER — HOSPITAL ENCOUNTER (OUTPATIENT)
Dept: RADIOLOGY | Facility: CLINIC | Age: 75
Discharge: HOME | End: 2025-06-12
Payer: MEDICARE

## 2025-06-12 DIAGNOSIS — M25.551 BILATERAL HIP PAIN: ICD-10-CM

## 2025-06-12 DIAGNOSIS — M25.552 BILATERAL HIP PAIN: ICD-10-CM

## 2025-06-12 PROCEDURE — 73523 X-RAY EXAM HIPS BI 5/> VIEWS: CPT | Mod: BILATERAL PROCEDURE | Performed by: RADIOLOGY

## 2025-06-12 PROCEDURE — 73521 X-RAY EXAM HIPS BI 2 VIEWS: CPT

## 2025-08-19 LAB
ALBUMIN SERPL-MCNC: 4.4 G/DL (ref 3.6–5.1)
ALP SERPL-CCNC: 64 U/L (ref 35–144)
ALT SERPL-CCNC: 24 U/L (ref 9–46)
ANION GAP SERPL CALCULATED.4IONS-SCNC: 10 MMOL/L (CALC) (ref 7–17)
AST SERPL-CCNC: 20 U/L (ref 10–35)
BILIRUB SERPL-MCNC: 0.7 MG/DL (ref 0.2–1.2)
BUN SERPL-MCNC: 16 MG/DL (ref 7–25)
CALCIUM SERPL-MCNC: 9.4 MG/DL (ref 8.6–10.3)
CHLORIDE SERPL-SCNC: 99 MMOL/L (ref 98–110)
CHOLEST SERPL-MCNC: 106 MG/DL
CHOLEST/HDLC SERPL: 3.1 (CALC)
CO2 SERPL-SCNC: 29 MMOL/L (ref 20–32)
CREAT SERPL-MCNC: 1 MG/DL (ref 0.7–1.28)
EGFRCR SERPLBLD CKD-EPI 2021: 79 ML/MIN/1.73M2
ERYTHROCYTE [DISTWIDTH] IN BLOOD BY AUTOMATED COUNT: 13.4 % (ref 11–15)
GLUCOSE SERPL-MCNC: 105 MG/DL (ref 65–99)
HCT VFR BLD AUTO: 41.9 % (ref 38.5–50)
HDLC SERPL-MCNC: 34 MG/DL
HGB BLD-MCNC: 14.1 G/DL (ref 13.2–17.1)
LDLC SERPL CALC-MCNC: 45 MG/DL (CALC)
MCH RBC QN AUTO: 30.1 PG (ref 27–33)
MCHC RBC AUTO-ENTMCNC: 33.7 G/DL (ref 32–36)
MCV RBC AUTO: 89.3 FL (ref 80–100)
NONHDLC SERPL-MCNC: 72 MG/DL (CALC)
PLATELET # BLD AUTO: 204 THOUSAND/UL (ref 140–400)
PMV BLD REES-ECKER: 12.2 FL (ref 7.5–12.5)
POTASSIUM SERPL-SCNC: 3.7 MMOL/L (ref 3.5–5.3)
PROT SERPL-MCNC: 7.3 G/DL (ref 6.1–8.1)
RBC # BLD AUTO: 4.69 MILLION/UL (ref 4.2–5.8)
SODIUM SERPL-SCNC: 138 MMOL/L (ref 135–146)
TRIGL SERPL-MCNC: 204 MG/DL
WBC # BLD AUTO: 6.8 THOUSAND/UL (ref 3.8–10.8)

## 2025-08-29 DIAGNOSIS — I10 PRIMARY HYPERTENSION: ICD-10-CM

## 2025-08-29 RX ORDER — VALSARTAN 320 MG/1
320 TABLET ORAL DAILY
Qty: 90 TABLET | Refills: 1 | Status: SHIPPED | OUTPATIENT
Start: 2025-08-29

## 2025-09-04 DIAGNOSIS — I10 PRIMARY HYPERTENSION: ICD-10-CM

## 2025-09-04 RX ORDER — CHLORTHALIDONE 25 MG/1
25 TABLET ORAL DAILY
Qty: 90 TABLET | Refills: 0 | Status: SHIPPED | OUTPATIENT
Start: 2025-09-04 | End: 2025-12-04

## 2025-12-04 ENCOUNTER — APPOINTMENT (OUTPATIENT)
Dept: PRIMARY CARE | Facility: CLINIC | Age: 75
End: 2025-12-04
Payer: MEDICARE

## 2025-12-16 ENCOUNTER — APPOINTMENT (OUTPATIENT)
Dept: PRIMARY CARE | Facility: CLINIC | Age: 75
End: 2025-12-16
Payer: MEDICARE